# Patient Record
Sex: FEMALE | Race: WHITE | Employment: UNEMPLOYED | URBAN - METROPOLITAN AREA
[De-identification: names, ages, dates, MRNs, and addresses within clinical notes are randomized per-mention and may not be internally consistent; named-entity substitution may affect disease eponyms.]

---

## 2017-04-28 ENCOUNTER — HOSPITAL ENCOUNTER (OUTPATIENT)
Dept: MAMMOGRAPHY | Age: 42
Discharge: HOME | End: 2017-04-28
Attending: FAMILY MEDICINE
Payer: COMMERCIAL

## 2017-04-28 DIAGNOSIS — Z12.31 ENCOUNTER FOR SCREENING MAMMOGRAM FOR BREAST CANCER: ICD-10-CM

## 2017-04-28 PROCEDURE — 77067 SCR MAMMO BI INCL CAD: CPT

## 2017-05-09 PROBLEM — E11.9 CONTROLLED TYPE 2 DIABETES MELLITUS WITHOUT COMPLICATION, WITHOUT LONG-TERM CURRENT USE OF INSULIN (HCC): Status: ACTIVE | Noted: 2017-05-09

## 2017-05-09 PROBLEM — E78.2 MIXED HYPERLIPIDEMIA: Status: ACTIVE | Noted: 2017-05-09

## 2017-05-09 PROBLEM — I10 ESSENTIAL HYPERTENSION: Status: ACTIVE | Noted: 2017-05-09

## 2017-06-19 PROBLEM — R22.9 SUBCUTANEOUS MASS: Status: ACTIVE | Noted: 2017-06-19

## 2018-05-01 PROBLEM — E11.21 TYPE 2 DIABETES WITH NEPHROPATHY (HCC): Status: ACTIVE | Noted: 2018-05-01

## 2018-05-01 PROBLEM — E66.01 OBESITY, MORBID (HCC): Status: ACTIVE | Noted: 2018-05-01

## 2018-05-29 ENCOUNTER — HOSPITAL ENCOUNTER (OUTPATIENT)
Dept: MAMMOGRAPHY | Age: 43
Discharge: HOME | End: 2018-05-29
Attending: FAMILY MEDICINE
Payer: COMMERCIAL

## 2018-05-29 DIAGNOSIS — Z12.39 SCREENING BREAST EXAMINATION: ICD-10-CM

## 2018-05-29 PROCEDURE — 77067 SCR MAMMO BI INCL CAD: CPT

## 2018-06-01 NOTE — PROGRESS NOTES
Your mammogram was normal.  You have dense breast.  This increases your risk of breast cancer. Recommend you do monthly self breast exams. Recommend yearly clinical breast exams and yearly mammograms.

## 2019-06-26 ENCOUNTER — HOSPITAL ENCOUNTER (OUTPATIENT)
Dept: MAMMOGRAPHY | Age: 44
Discharge: HOME | End: 2019-06-26
Attending: OBSTETRICS & GYNECOLOGY
Payer: COMMERCIAL

## 2019-06-26 DIAGNOSIS — Z12.31 VISIT FOR SCREENING MAMMOGRAM: ICD-10-CM

## 2019-06-26 PROCEDURE — 77063 BREAST TOMOSYNTHESIS BI: CPT

## 2019-08-03 ENCOUNTER — APPOINTMENT (OUTPATIENT)
Dept: CT IMAGING | Age: 44
DRG: 660 | End: 2019-08-03
Attending: EMERGENCY MEDICINE
Payer: COMMERCIAL

## 2019-08-03 ENCOUNTER — HOSPITAL ENCOUNTER (INPATIENT)
Age: 44
LOS: 3 days | Discharge: HOME | DRG: 660 | End: 2019-08-06
Attending: EMERGENCY MEDICINE | Admitting: FAMILY MEDICINE
Payer: COMMERCIAL

## 2019-08-03 ENCOUNTER — ANESTHESIA EVENT (OUTPATIENT)
Dept: SURGERY | Age: 44
DRG: 660 | End: 2019-08-03
Payer: COMMERCIAL

## 2019-08-03 ENCOUNTER — APPOINTMENT (OUTPATIENT)
Dept: GENERAL RADIOLOGY | Age: 44
DRG: 660 | End: 2019-08-03
Attending: UROLOGY
Payer: COMMERCIAL

## 2019-08-03 ENCOUNTER — APPOINTMENT (OUTPATIENT)
Dept: GENERAL RADIOLOGY | Age: 44
DRG: 660 | End: 2019-08-03
Attending: EMERGENCY MEDICINE
Payer: COMMERCIAL

## 2019-08-03 DIAGNOSIS — N20.0 RENAL STONE: ICD-10-CM

## 2019-08-03 DIAGNOSIS — E87.1 HYPONATREMIA: ICD-10-CM

## 2019-08-03 DIAGNOSIS — E87.5 ACUTE HYPERKALEMIA: ICD-10-CM

## 2019-08-03 DIAGNOSIS — N17.9 ACUTE RENAL FAILURE, UNSPECIFIED ACUTE RENAL FAILURE TYPE (HCC): Primary | ICD-10-CM

## 2019-08-03 LAB
ALBUMIN SERPL-MCNC: 3.6 G/DL (ref 3.5–5)
ALBUMIN/GLOB SERPL: 0.9 {RATIO} (ref 1.2–3.5)
ALP SERPL-CCNC: 60 U/L (ref 50–136)
ALT SERPL-CCNC: 21 U/L (ref 12–65)
ANION GAP SERPL CALC-SCNC: 15 MMOL/L (ref 7–16)
APPEARANCE UR: CLEAR
AST SERPL-CCNC: 14 U/L (ref 15–37)
BACTERIA URNS QL MICRO: ABNORMAL /HPF
BASOPHILS # BLD: 0 K/UL (ref 0–0.2)
BASOPHILS NFR BLD: 0 % (ref 0–2)
BILIRUB SERPL-MCNC: 0.4 MG/DL (ref 0.2–1.1)
BILIRUB UR QL: NEGATIVE
BUN SERPL-MCNC: 45 MG/DL (ref 6–23)
CALCIUM SERPL-MCNC: 8.9 MG/DL (ref 8.3–10.4)
CASTS URNS QL MICRO: ABNORMAL /LPF
CHLORIDE SERPL-SCNC: 92 MMOL/L (ref 98–107)
CK SERPL-CCNC: 312 U/L (ref 21–215)
CO2 SERPL-SCNC: 19 MMOL/L (ref 21–32)
COLOR UR: YELLOW
CREAT SERPL-MCNC: 9.2 MG/DL (ref 0.6–1)
CREAT UR-MCNC: 50.9 MG/DL
DIFFERENTIAL METHOD BLD: ABNORMAL
EOSINOPHIL # BLD: 0.2 K/UL (ref 0–0.8)
EOSINOPHIL NFR BLD: 2 % (ref 0.5–7.8)
EPI CELLS #/AREA URNS HPF: ABNORMAL /HPF
ERYTHROCYTE [DISTWIDTH] IN BLOOD BY AUTOMATED COUNT: 14.7 % (ref 11.9–14.6)
GLOBULIN SER CALC-MCNC: 3.8 G/DL (ref 2.3–3.5)
GLUCOSE BLD STRIP.AUTO-MCNC: 27 MG/DL (ref 65–100)
GLUCOSE BLD STRIP.AUTO-MCNC: 401 MG/DL (ref 65–100)
GLUCOSE BLD STRIP.AUTO-MCNC: 46 MG/DL (ref 65–100)
GLUCOSE BLD STRIP.AUTO-MCNC: 48 MG/DL (ref 65–100)
GLUCOSE BLD STRIP.AUTO-MCNC: 71 MG/DL (ref 65–100)
GLUCOSE BLD STRIP.AUTO-MCNC: 75 MG/DL (ref 65–100)
GLUCOSE BLD STRIP.AUTO-MCNC: 93 MG/DL (ref 65–100)
GLUCOSE BLD STRIP.AUTO-MCNC: 94 MG/DL (ref 65–100)
GLUCOSE BLD STRIP.AUTO-MCNC: 99 MG/DL (ref 65–100)
GLUCOSE SERPL-MCNC: 121 MG/DL (ref 65–100)
GLUCOSE UR STRIP.AUTO-MCNC: NEGATIVE MG/DL
HCG UR QL: NEGATIVE
HCT VFR BLD AUTO: 31.9 % (ref 35.8–46.3)
HGB BLD-MCNC: 10.2 G/DL (ref 11.7–15.4)
HGB UR QL STRIP: ABNORMAL
IMM GRANULOCYTES # BLD AUTO: 0 K/UL (ref 0–0.5)
IMM GRANULOCYTES NFR BLD AUTO: 0 % (ref 0–5)
KETONES UR QL STRIP.AUTO: NEGATIVE MG/DL
LACTATE SERPL-SCNC: 1.3 MMOL/L (ref 0.4–2)
LEUKOCYTE ESTERASE UR QL STRIP.AUTO: NEGATIVE
LYMPHOCYTES # BLD: 1.6 K/UL (ref 0.5–4.6)
LYMPHOCYTES NFR BLD: 17 % (ref 13–44)
MCH RBC QN AUTO: 26.6 PG (ref 26.1–32.9)
MCHC RBC AUTO-ENTMCNC: 32 G/DL (ref 31.4–35)
MCV RBC AUTO: 83.3 FL (ref 79.6–97.8)
MONOCYTES # BLD: 0.4 K/UL (ref 0.1–1.3)
MONOCYTES NFR BLD: 4 % (ref 4–12)
NEUTS SEG # BLD: 6.8 K/UL (ref 1.7–8.2)
NEUTS SEG NFR BLD: 76 % (ref 43–78)
NITRITE UR QL STRIP.AUTO: NEGATIVE
NRBC # BLD: 0 K/UL (ref 0–0.2)
OTHER OBSERVATIONS,UCOM: ABNORMAL
PH UR STRIP: 5 [PH] (ref 5–9)
PHOSPHATE SERPL-MCNC: 5.8 MG/DL (ref 2.5–4.5)
PLATELET # BLD AUTO: 352 K/UL (ref 150–450)
PMV BLD AUTO: 11.1 FL (ref 9.4–12.3)
POTASSIUM SERPL-SCNC: 4.1 MMOL/L (ref 3.5–5.1)
POTASSIUM SERPL-SCNC: 5.9 MMOL/L (ref 3.5–5.1)
PROT SERPL-MCNC: 7.4 G/DL (ref 6.3–8.2)
PROT UR STRIP-MCNC: ABNORMAL MG/DL
PROT UR-MCNC: 40 MG/DL
PROT/CREAT UR-RTO: 0.8
RBC # BLD AUTO: 3.83 M/UL (ref 4.05–5.2)
RBC #/AREA URNS HPF: ABNORMAL /HPF
SODIUM SERPL-SCNC: 126 MMOL/L (ref 136–145)
SP GR UR REFRACTOMETRY: 1 (ref 1–1.02)
UROBILINOGEN UR QL STRIP.AUTO: 0.2 EU/DL (ref 0.2–1)
WBC # BLD AUTO: 9 K/UL (ref 4.3–11.1)
WBC URNS QL MICRO: ABNORMAL /HPF

## 2019-08-03 PROCEDURE — 74011250636 HC RX REV CODE- 250/636: Performed by: FAMILY MEDICINE

## 2019-08-03 PROCEDURE — 71045 X-RAY EXAM CHEST 1 VIEW: CPT

## 2019-08-03 PROCEDURE — 74011250636 HC RX REV CODE- 250/636: Performed by: EMERGENCY MEDICINE

## 2019-08-03 PROCEDURE — 74011250637 HC RX REV CODE- 250/637: Performed by: EMERGENCY MEDICINE

## 2019-08-03 PROCEDURE — 65270000029 HC RM PRIVATE

## 2019-08-03 PROCEDURE — 84132 ASSAY OF SERUM POTASSIUM: CPT

## 2019-08-03 PROCEDURE — 74011000250 HC RX REV CODE- 250: Performed by: INTERNAL MEDICINE

## 2019-08-03 PROCEDURE — 96374 THER/PROPH/DIAG INJ IV PUSH: CPT | Performed by: EMERGENCY MEDICINE

## 2019-08-03 PROCEDURE — 81001 URINALYSIS AUTO W/SCOPE: CPT

## 2019-08-03 PROCEDURE — 84156 ASSAY OF PROTEIN URINE: CPT

## 2019-08-03 PROCEDURE — 74011250637 HC RX REV CODE- 250/637: Performed by: FAMILY MEDICINE

## 2019-08-03 PROCEDURE — 74011000250 HC RX REV CODE- 250: Performed by: FAMILY MEDICINE

## 2019-08-03 PROCEDURE — 82550 ASSAY OF CK (CPK): CPT

## 2019-08-03 PROCEDURE — 85025 COMPLETE CBC W/AUTO DIFF WBC: CPT

## 2019-08-03 PROCEDURE — 82962 GLUCOSE BLOOD TEST: CPT

## 2019-08-03 PROCEDURE — 74018 RADEX ABDOMEN 1 VIEW: CPT

## 2019-08-03 PROCEDURE — 74176 CT ABD & PELVIS W/O CONTRAST: CPT

## 2019-08-03 PROCEDURE — 80053 COMPREHEN METABOLIC PANEL: CPT

## 2019-08-03 PROCEDURE — 81025 URINE PREGNANCY TEST: CPT

## 2019-08-03 PROCEDURE — 84100 ASSAY OF PHOSPHORUS: CPT

## 2019-08-03 PROCEDURE — 81003 URINALYSIS AUTO W/O SCOPE: CPT | Performed by: EMERGENCY MEDICINE

## 2019-08-03 PROCEDURE — 74011000258 HC RX REV CODE- 258: Performed by: INTERNAL MEDICINE

## 2019-08-03 PROCEDURE — 83605 ASSAY OF LACTIC ACID: CPT

## 2019-08-03 PROCEDURE — 74011250637 HC RX REV CODE- 250/637: Performed by: INTERNAL MEDICINE

## 2019-08-03 PROCEDURE — 99285 EMERGENCY DEPT VISIT HI MDM: CPT | Performed by: EMERGENCY MEDICINE

## 2019-08-03 PROCEDURE — 36415 COLL VENOUS BLD VENIPUNCTURE: CPT

## 2019-08-03 RX ORDER — DEXTROSE 25 % IN WATER 25 %
2.5 SYRINGE (ML) INTRAVENOUS AS NEEDED
Status: DISCONTINUED | OUTPATIENT
Start: 2019-08-03 | End: 2019-08-03 | Stop reason: SDUPTHER

## 2019-08-03 RX ORDER — INSULIN LISPRO 100 [IU]/ML
INJECTION, SOLUTION INTRAVENOUS; SUBCUTANEOUS
Status: DISCONTINUED | OUTPATIENT
Start: 2019-08-03 | End: 2019-08-06 | Stop reason: HOSPADM

## 2019-08-03 RX ORDER — BUPROPION HYDROCHLORIDE 150 MG/1
150 TABLET, EXTENDED RELEASE ORAL 2 TIMES DAILY
Status: DISCONTINUED | OUTPATIENT
Start: 2019-08-03 | End: 2019-08-06 | Stop reason: HOSPADM

## 2019-08-03 RX ORDER — HYDROCODONE BITARTRATE AND ACETAMINOPHEN 5; 325 MG/1; MG/1
1 TABLET ORAL
Status: DISCONTINUED | OUTPATIENT
Start: 2019-08-03 | End: 2019-08-06 | Stop reason: HOSPADM

## 2019-08-03 RX ORDER — SODIUM CHLORIDE 0.9 % (FLUSH) 0.9 %
5-40 SYRINGE (ML) INJECTION AS NEEDED
Status: DISCONTINUED | OUTPATIENT
Start: 2019-08-03 | End: 2019-08-06 | Stop reason: HOSPADM

## 2019-08-03 RX ORDER — DEXTROSE 50 % IN WATER (D50W) INTRAVENOUS SYRINGE
50 AS NEEDED
Status: DISCONTINUED | OUTPATIENT
Start: 2019-08-03 | End: 2019-08-06 | Stop reason: HOSPADM

## 2019-08-03 RX ORDER — DULOXETIN HYDROCHLORIDE 60 MG/1
60 CAPSULE, DELAYED RELEASE ORAL DAILY
Status: DISCONTINUED | OUTPATIENT
Start: 2019-08-03 | End: 2019-08-06 | Stop reason: HOSPADM

## 2019-08-03 RX ORDER — SODIUM CHLORIDE 9 MG/ML
150 INJECTION, SOLUTION INTRAVENOUS CONTINUOUS
Status: DISCONTINUED | OUTPATIENT
Start: 2019-08-03 | End: 2019-08-03

## 2019-08-03 RX ORDER — LORAZEPAM 0.5 MG/1
0.5 TABLET ORAL
Status: DISCONTINUED | OUTPATIENT
Start: 2019-08-03 | End: 2019-08-06 | Stop reason: HOSPADM

## 2019-08-03 RX ORDER — HEPARIN SODIUM 5000 [USP'U]/ML
5000 INJECTION, SOLUTION INTRAVENOUS; SUBCUTANEOUS EVERY 8 HOURS
Status: DISCONTINUED | OUTPATIENT
Start: 2019-08-03 | End: 2019-08-03

## 2019-08-03 RX ORDER — ACETAMINOPHEN 325 MG/1
650 TABLET ORAL
Status: DISCONTINUED | OUTPATIENT
Start: 2019-08-03 | End: 2019-08-06 | Stop reason: HOSPADM

## 2019-08-03 RX ORDER — PANTOPRAZOLE SODIUM 40 MG/1
40 TABLET, DELAYED RELEASE ORAL
Status: DISCONTINUED | OUTPATIENT
Start: 2019-08-04 | End: 2019-08-06 | Stop reason: HOSPADM

## 2019-08-03 RX ORDER — ONDANSETRON 4 MG/1
4 TABLET, ORALLY DISINTEGRATING ORAL
Status: DISCONTINUED | OUTPATIENT
Start: 2019-08-03 | End: 2019-08-06 | Stop reason: HOSPADM

## 2019-08-03 RX ORDER — ACETAMINOPHEN 500 MG
1000 TABLET ORAL
Status: COMPLETED | OUTPATIENT
Start: 2019-08-03 | End: 2019-08-03

## 2019-08-03 RX ORDER — SODIUM CHLORIDE 0.9 % (FLUSH) 0.9 %
5-40 SYRINGE (ML) INJECTION EVERY 8 HOURS
Status: DISCONTINUED | OUTPATIENT
Start: 2019-08-03 | End: 2019-08-06 | Stop reason: HOSPADM

## 2019-08-03 RX ORDER — ONDANSETRON 2 MG/ML
4 INJECTION INTRAMUSCULAR; INTRAVENOUS
Status: COMPLETED | OUTPATIENT
Start: 2019-08-03 | End: 2019-08-03

## 2019-08-03 RX ORDER — BISACODYL 5 MG
5 TABLET, DELAYED RELEASE (ENTERIC COATED) ORAL DAILY PRN
Status: DISCONTINUED | OUTPATIENT
Start: 2019-08-03 | End: 2019-08-06 | Stop reason: HOSPADM

## 2019-08-03 RX ORDER — ATORVASTATIN CALCIUM 40 MG/1
40 TABLET, FILM COATED ORAL
Status: DISCONTINUED | OUTPATIENT
Start: 2019-08-03 | End: 2019-08-06 | Stop reason: HOSPADM

## 2019-08-03 RX ADMIN — PATIROMER 8.4 G: 8.4 POWDER, FOR SUSPENSION ORAL at 15:31

## 2019-08-03 RX ADMIN — ONDANSETRON 4 MG: 4 TABLET, ORALLY DISINTEGRATING ORAL at 21:37

## 2019-08-03 RX ADMIN — Medication: at 12:00

## 2019-08-03 RX ADMIN — LORAZEPAM 0.5 MG: 0.5 TABLET ORAL at 11:14

## 2019-08-03 RX ADMIN — SODIUM CHLORIDE 150 ML/HR: 900 INJECTION, SOLUTION INTRAVENOUS at 11:00

## 2019-08-03 RX ADMIN — ACETAMINOPHEN 650 MG: 325 TABLET, FILM COATED ORAL at 15:37

## 2019-08-03 RX ADMIN — DULOXETINE HYDROCHLORIDE 60 MG: 60 CAPSULE, DELAYED RELEASE ORAL at 11:14

## 2019-08-03 RX ADMIN — DEXTROSE 50 % IN WATER (D50W) INTRAVENOUS SYRINGE 25 G: at 16:02

## 2019-08-03 RX ADMIN — Medication 10 ML: at 21:40

## 2019-08-03 RX ADMIN — SODIUM CHLORIDE 1000 ML: 900 INJECTION, SOLUTION INTRAVENOUS at 09:23

## 2019-08-03 RX ADMIN — ONDANSETRON 4 MG: 4 TABLET, ORALLY DISINTEGRATING ORAL at 11:22

## 2019-08-03 RX ADMIN — ONDANSETRON 4 MG: 2 INJECTION INTRAMUSCULAR; INTRAVENOUS at 07:13

## 2019-08-03 RX ADMIN — ACETAMINOPHEN 1000 MG: 500 TABLET, FILM COATED ORAL at 09:23

## 2019-08-03 RX ADMIN — Medication: at 22:14

## 2019-08-03 RX ADMIN — DEXTROSE 50 % IN WATER (D50W) INTRAVENOUS SYRINGE 25 G: at 11:35

## 2019-08-03 RX ADMIN — Medication 10 ML: at 14:00

## 2019-08-03 RX ADMIN — ACETAMINOPHEN 650 MG: 325 TABLET, FILM COATED ORAL at 21:37

## 2019-08-03 RX ADMIN — HYDROCODONE BITARTRATE AND ACETAMINOPHEN 1 TABLET: 5; 325 TABLET ORAL at 18:11

## 2019-08-03 RX ADMIN — BUPROPION HYDROCHLORIDE 150 MG: 150 TABLET, EXTENDED RELEASE ORAL at 11:14

## 2019-08-03 RX ADMIN — ATORVASTATIN CALCIUM 40 MG: 40 TABLET, FILM COATED ORAL at 21:37

## 2019-08-03 NOTE — PROGRESS NOTES
08/03/19 1400   Dual Skin Pressure Injury Assessment   Dual Skin Pressure Injury Assessment WDL   Second Care Provider (Based on [address] [place] [address]) LYNDSAY [place]   Skin Integumentary   Skin Integumentary (WDL) WDL   Wound Prevention and Protection Methods   Orientation of Wound Prevention Posterior   Location of Wound Prevention Sacrum/Coccyx   Dressing Present  Yes   Dressing Status Intact   Wound Offloading (Prevention Methods) Bed, pressure redistribution/air; Adaptive equipment;Bed, pressure reduction mattress

## 2019-08-03 NOTE — ED NOTES
TRANSFER - OUT REPORT:    Verbal report given to six floor on Sunday ЮЛИЯ Hernandez  being transferred to [facility](unit) for routine progression of care       Report consisted of patients Situation, Background, Assessment and   Recommendations(SBAR). Information from the following report(s) SBAR was reviewed with the receiving nurse. Lines:   Peripheral IV 08/03/19 Left Antecubital (Active)       Peripheral IV 08/03/19 Left Forearm (Active)        Opportunity for questions and clarification was provided.       Patient transported with:   [employer]

## 2019-08-03 NOTE — CONSULTS
GEN GENERIC H&P/CONSULT    Subjective:      Patient is a 38yo F with a history of CKD III ( seen at our office ), DM on  metformin, HTN, and anxiety who presents with episode of confusion, shaking, with  Hypoglycemia with BS down to 30's at home. Received oral and IV dextrose and blood sugars improved. Found to have acute renal failure with cr  Up to 9.20. Notion of 3 months unintentional 30 pounds weight loss and nausea. No abdominal pain. No difficulty of urination but decrease in urine output, no gross hematuria, no dysuria. No skin rash, no arthralgia, no fever, no myalgia   Pt has been drinking plenty of fluid  Treated recently for UTI with Cipro  Lab:     Ref. Range 5/29/2018 09:00 4/25/2019 08:49 5/2/2019 08:32 7/16/2019 15:45 8/3/2019 07:15   Creatinine Latest Ref Range: 0.6 - 1.0 MG/DL 1.30 (H) 2.41 (H) 1.71 (H) 3.13 (>) 9.20 (H)     Ct scan: 9 mm calculus in the left renal pelvis. Minimal perinephric  stranding without hydronephrosis.     Past Medical History:   Diagnosis Date    PANCHO (acute kidney injury) ([facility] [address]     saw nephrologist 2/26/14. [place] due to volume depletion, meds. Meds adjusted, now f/u with PCP    Diabetes ([facility] [address] 2011    [place], avgfbs 115, last HA1C 6.6, does not experience hypo    Gall bladder disease     GERD (gastroesophageal reflux disease)     controlled with protonix    Hypercholesterolemia     Hypertension     well controlled by medication    Morbid obesity ([address]     BMI 41.7    Psychiatric disorder     depression , controlled by cymbalta    Sleep apnea       Past Surgical History:   Procedure Laterality Date    HX CHOLECYSTECTOMY      HX TONSILLECTOMY        Prior to Admission medications    Medication Sig Start Date End Date Taking? Authorizing Provider   cefUROXime (CEFTIN) 250 mg tablet Take 1 Tab by mouth two (2) times a day. 7/17/19   Reggie Chinchilla MD   omeprazole (PRILOSEC) 10 mg capsule Take 1 Cap by mouth daily.  7/16/19   Reggie Chinchilla MD fluticasone propionate (FLONASE) 50 mcg/actuation nasal spray  5/10/19   Provider, Historical   ondansetron (ZOFRAN ODT) 4 mg disintegrating tablet Take 1 Tab by mouth every eight (8) hours as needed for Nausea. 7/16/19   Vance Franco MD   norethindrone (JASON) 0.35 mg tab Take 1 Tab by mouth daily. 5/20/19   Brandon Goldstein MD   glyBURIDE-metFORMIN (GLUCOVANCE) 2.5-500 mg per tablet Take 2 Tabs by mouth two (2) times daily (with meals). 5/7/19   Vance Franco MD   atorvastatin (LIPITOR) 40 mg tablet TAKE ONE TABLET BY MOUTH ONCE DAILY. 5/7/19   Vance Franco MD   LORazepam (ATIVAN) 1 mg tablet Take 1/2 to one tablet twice a day as needed 5/7/19   Vance Franco MD   buPROPion XL (WELLBUTRIN XL) 150 mg tablet Take 1 Tab by mouth every morning. 5/7/19   Vance Franco MD   DULoxetine (CYMBALTA) 60 mg capsule Take one capsule a day 11/6/18   Vance Franco MD   losartan-hydroCHLOROthiazide [facility]) 100-12.5 mg per tablet TAKE ONE TABLET BY MOUTH ONCE DAILY. 11/6/18   Vance Franco MD   cpap machine kit by Does Not Apply route. Replacement machine 10/5/18   Vance Franco MD   OTHER CPAP supplies 10/5/18   Vance Franco MD   Ferrous Fumarate 325 mg (106 mg iron) tab Take  by mouth. Provider, Historical   levocetirizine (XYZAL) 5 mg tablet Take 1 Tab by mouth daily. Dx:477.9 - failed allegra, claritin, zyrtec and flonase 5/9/17   Vance Franco MD   cpap machine kit by Does Not Apply route. 14cm    Provider, Historical   glucose blood VI test strips (ONETOUCH ULTRA TEST) strip Test blood sugar daily, either fasting or 2 hours after eating for diabetes and record in log. (#100)  Dx: 250.00 DM 5/29/15   Kaitlynn Gill MD   aspirin delayed-release 81 mg tablet Take  by mouth daily.     Provider, Historical     Allergies   Allergen Reactions    Amoxil [Amoxicillin] Other (comments)     Itching     Percocet [Oxycodone-Acetaminophen] Itching      Social History Tobacco Use    Smoking status: Never Smoker    Smokeless tobacco: Never Used   Substance Use Topics    Alcohol use: Yes     Alcohol/week: 0.0 standard drinks     Comment: occasional 1 gls per month      Family History   Adopted: Yes   Family history unknown: Yes          Review of Systems  As above    Objective:       Visit Vitals  /77   Pulse 96   Temp 97.7 °F (36.5 °C)   Resp 18   Ht 5' 5\" (1.651 m)   Wt 98.9 kg (218 lb)   SpO2 100%   BMI 36.28 kg/m²       No intake/output data recorded. No intake/output data recorded. PE:  NARD  Lung: clear. CV: RR, no rub  Abd: soft, not tender  Ext: no edema    Data Review:     Recent Results (from the past 24 hour(s))   GLUCOSE, POC    Collection Time: 08/03/19  7:06 AM   Result Value Ref Range    Glucose (POC) 401 (H) 65 - 100 mg/dL   CBC WITH AUTOMATED DIFF    Collection Time: 08/03/19  7:15 AM   Result Value Ref Range    WBC 9.0 4.3 - 11.1 K/uL    RBC 3.83 (L) 4.05 - 5.2 M/uL    HGB 10.2 (L) 11.7 - 15.4 g/dL    HCT 31.9 (L) 35.8 - 46.3 %    MCV 83.3 79.6 - 97.8 FL    MCH 26.6 26.1 - 32.9 PG    MCHC 32.0 31.4 - 35.0 g/dL    RDW 14.7 (H) 11.9 - 14.6 %    PLATELET 920 518 - 139 K/uL    MPV 11.1 9.4 - 12.3 FL    ABSOLUTE NRBC 0.00 0.0 - 0.2 K/uL    DF AUTOMATED      NEUTROPHILS 76 43 - 78 %    LYMPHOCYTES 17 13 - 44 %    MONOCYTES 4 4.0 - 12.0 %    EOSINOPHILS 2 0.5 - 7.8 %    BASOPHILS 0 0.0 - 2.0 %    IMMATURE GRANULOCYTES 0 0.0 - 5.0 %    ABS. NEUTROPHILS 6.8 1.7 - 8.2 K/UL    ABS. LYMPHOCYTES 1.6 0.5 - 4.6 K/UL    ABS. MONOCYTES 0.4 0.1 - 1.3 K/UL    ABS. EOSINOPHILS 0.2 0.0 - 0.8 K/UL    ABS. BASOPHILS 0.0 0.0 - 0.2 K/UL    ABS. IMM.  GRANS. 0.0 0.0 - 0.5 K/UL   METABOLIC PANEL, COMPREHENSIVE    Collection Time: 08/03/19  7:15 AM   Result Value Ref Range    Sodium 126 (L) 136 - 145 mmol/L    Potassium 5.9 (H) 3.5 - 5.1 mmol/L    Chloride 92 (L) 98 - 107 mmol/L    CO2 19 (L) 21 - 32 mmol/L    Anion gap 15 7 - 16 mmol/L    Glucose 121 (H) 65 - 100 mg/dL BUN 45 (H) 6 - 23 MG/DL    Creatinine 9.20 (H) 0.6 - 1.0 MG/DL    GFR est AA 6 (L) >60 ml/min/1.73m2    GFR est non-AA 5 (L) >60 ml/min/1.73m2    Calcium 8.9 8.3 - 10.4 MG/DL    Bilirubin, total 0.4 0.2 - 1.1 MG/DL    ALT (SGPT) 21 12 - 65 U/L    AST (SGOT) 14 (L) 15 - 37 U/L    Alk. phosphatase 60 50 - 136 U/L    Protein, total 7.4 6.3 - 8.2 g/dL    Albumin 3.6 3.5 - 5.0 g/dL    Globulin 3.8 (H) 2.3 - 3.5 g/dL    A-G Ratio 0.9 (L) 1.2 - 3.5     HCG URINE, QL. - POC    Collection Time: 08/03/19  8:59 AM   Result Value Ref Range    Pregnancy test,urine (POC) NEGATIVE  NEG             Assessment:     Principal Problem:    Acute renal failure (ARF) ([facility] [address] (8/3/2019)    Active Problems:    HTN (hypertension) (10/3/2013)      MARILY (obstructive sleep apnea) (1/7/2016)      Mixed hyperlipidemia (5/9/2017)      Obesity, morbid ([facility] [address] (5/1/2018)      Type 2 diabetes with nephropathy ([facility] [address] (5/1/2018)      Renal stone (8/3/2019)     PANCHO (on CKD III): L. Kidney stone but no hydro. ( but volume status may be be on low side)  May be AIN due to Cipro. Mild hyperkalemia  Mild acidosis? Plan:   Check UA, urine lytes, urine protein, CK lactic acid. Continue with hydration with NaHCO3  [place]. Low K diet. May need kidney Bx   Urology consult.   Thanks  Dr. Junior Anderson  Hopefully, may avoid dialysis

## 2019-08-03 NOTE — CONSULTS
[address] Urology Consult Note                                           08/03/19     Patient: Sunday Fabiola Marroquin  MRN: [id number]    Admission Date:  8/3/2019, 0  Admission Diagnosis: Acute renal failure (ARF) ([facility] [address] [N17.9]  Reason for Consult: Left Kidney Stone    ASSESSMENT: 39 y.o. female PANCHO and 9 mm left renal pelvis stone who is admitted for PANCHO, hypoglycemia, confusion. Urology is consulted for the kidney stone. Khushbu Yeh is in the renal pelvis and without hydro-ureteronephrosis. I am not convinced that the represents the sole source of her PANCHO given that it is in the renal pelvis (not ureter) and there is no hydro-ureteronephrosis. She also has no flank pain. However, I do recommend intervention to eliminate the stone as a possible source of her PANCHO, as it could be causing intermittent ureteral obstruction     PLAN:  I reviewed the risks/benefits/alternatives for stone treatment today in detail with the patient. Treatment options discussed include medical expulsive therapy (MET) vs. ESWL vs. Ureteroscopy/laser lithotropsy vs. PCNL. MET not recommended given PANCHO. We discussed that her two options are ESWL + stent and ureteroscopy/laser lithotripsy. After our discussion, the patient would like to proceed with cystoscopy, left ureteral stent placement, LEFT ESWL. Risks of ESWL that we discussed were bleeding, infection, flank pain, bruising, renal hematoma, injury to surrounding structures, incomplete fragmentation of stones, steinstrasse, inability to pass stone fragments requiring additional operative intervention in the form of ureteroscopy/laser lithotripsy and/or stent placement, renal failure, hypertension, risks of general anesthesia, recurrent stones. The patient expressed understanding of the above. Will obtain Stat KUB to ensure that stone is visible. If stone is visible then will proceed.   If not visible, will change to ureteroscopy/laser lithotripsy. Patient will be NPO at midnight for surgery tomorrow. Heparin PPX held starting now in anticipation of surgery tomorrow. On ASA 81 mg at home but has been off of this X 2 days. No other anti-coagulation. >50% of visit spent counseling patient on the above.    __________________________________________________________________________________    HPI:     Sunday Helen Shaikh is a 39 y.o. female with a history of CKD secondary to DM, who was admitted to the hospital from the ER for hypoglycemia (blood sugar in the 30's), confusion and shaking. Cr was acutely elevated to 9.20 from baseline of 3.3 in the ER. CT A/P in the ER showed an incidental 9 mm L renal pelvis stone without hydronephrosis or definite signs of obstruction. She denies a history of kidney stones. No fevers. No hematuria, urgency, frequency, retention, incontinence. She follows with Nephrology for her CKD. Past Medical History:  Past Medical History:   Diagnosis Date    PANCHO (acute kidney injury) ([facility] [address]     [place] nephrologist 2/26/14. [place] due to volume depletion, meds. Meds adjusted, now f/u with PCP    Diabetes ([facility] [address] 2011    [place], avgfbs 115, last HA1C 6.6, does not experience hypo    Gall bladder disease     GERD (gastroesophageal reflux disease)     controlled with protonix    Hypercholesterolemia     Hypertension     well controlled by medication    Morbid obesity ([facility] [address]     BMI 41.7    Psychiatric disorder     depression , controlled by cymbalta    Sleep apnea        Past Surgical History:  Past Surgical History:   Procedure Laterality Date    HX CHOLECYSTECTOMY      HX TONSILLECTOMY         Medications:  No current facility-administered medications on file prior to encounter. Current Outpatient Medications on File Prior to Encounter   Medication Sig Dispense Refill    cefUROXime (CEFTIN) 250 mg tablet Take 1 Tab by mouth two (2) times a day.  14 Tab 0    omeprazole (PRILOSEC) 10 mg capsule Take 1 Cap by mouth daily. 30 Cap 3    fluticasone propionate (FLONASE) 50 mcg/actuation nasal spray       ondansetron (ZOFRAN ODT) 4 mg disintegrating tablet Take 1 Tab by mouth every eight (8) hours as needed for Nausea. 12 Tab 1    norethindrone (JASON) 0.35 mg tab Take 1 Tab by mouth daily. 28 Tab 11    glyBURIDE-metFORMIN (GLUCOVANCE) 2.5-500 mg per tablet Take 2 Tabs by mouth two (2) times daily (with meals). 120 Tab 6    atorvastatin (LIPITOR) 40 mg tablet TAKE ONE TABLET BY MOUTH ONCE DAILY. 30 Tab 6    LORazepam (ATIVAN) 1 mg tablet Take 1/2 to one tablet twice a day as needed 30 Tab 0    buPROPion XL (WELLBUTRIN XL) 150 mg tablet Take 1 Tab by mouth every morning. 30 Tab 11    DULoxetine (CYMBALTA) 60 mg capsule Take one capsule a day 30 Cap 11    losartan-hydroCHLOROthiazide (HYZAAR) 100-12.5 mg per tablet TAKE ONE TABLET BY MOUTH ONCE DAILY. 30 Tab 10    cpap machine kit by Does Not Apply route. Replacement machine 1 Kit 0    OTHER CPAP supplies 1 Units 0    Ferrous Fumarate 325 mg (106 mg iron) tab Take  by mouth.  levocetirizine (XYZAL) 5 mg tablet Take 1 Tab by mouth daily. Dx:477.9 - failed allegra, claritin, zyrtec and flonase 30 Tab 11    cpap machine kit by Does Not Apply route. 14cm      glucose blood VI test strips (ONETOUCH ULTRA TEST) strip Test blood sugar daily, either fasting or 2 hours after eating for diabetes and record in log. (#100)  Dx: 250.00 DM 1 Each 11    aspirin delayed-release 81 mg tablet Take  by mouth daily. Allergies:   Allergies   Allergen Reactions    Amoxil [Amoxicillin] Other (comments)     Itching     Percocet [Oxycodone-Acetaminophen] Itching       Social History:  Social History     Socioeconomic History    Marital status:      Spouse name: Not on file    Number of children: Not on file    Years of education: Not on file    Highest education level: Not on file   Occupational History    Not on file   Social Needs    Financial resource strain: Not on file    Food insecurity:     Worry: Not on file     Inability: Not on file    Transportation needs:     Medical: Not on file     Non-medical: Not on file   Tobacco Use    Smoking status: Never Smoker    Smokeless tobacco: Never Used   Substance and Sexual Activity    Alcohol use:  Yes     Alcohol/week: 0.0 standard drinks     Comment: occasional 1 gls per month    Drug use: No    Sexual activity: Not Currently     Birth control/protection: Pill   Lifestyle    Physical activity:     Days per week: Not on file     Minutes per session: Not on file    Stress: Not on file   Relationships    Social connections:     Talks on phone: Not on file     Gets together: Not on file     Attends Oriental orthodox service: Not on file     Active member of club or organization: Not on file     Attends meetings of clubs or organizations: Not on file     Relationship status: Not on file    Intimate partner violence:     Fear of current or ex partner: Not on file     Emotionally abused: Not on file     Physically abused: Not on file     Forced sexual activity: Not on file   Other Topics Concern    Not on file   Social History Narrative    Not on file       Family History:  Family History   Adopted: Yes   Family history unknown: Yes       ROS:  Review of Systems - General ROS: negative for - chills, fatigue or fever  Respiratory ROS: no cough, shortness of breath, or wheezing  Cardiovascular ROS: no chest pain or dyspnea on exertion  Gastrointestinal ROS: no abdominal pain, change in bowel habits, or black or bloody stools  Musculoskeletal ROS: negative  negative for - muscular weakness    Vitals:  Visit Vitals  /77   Pulse 96   Temp 97.7 °F (36.5 °C)   Resp 18   Ht 5' 5\" (1.651 m)   Wt 218 lb (98.9 kg)   SpO2 100%   BMI 36.28 kg/m²       Intake/Output:  No intake or output data in the 24 hours ending 08/03/19 1346     Physical Exam:   Visit Vitals  /77   Pulse 96   Temp 97.7 °F (36.5 °C)   Resp 18 Ht 5' 5\" (1.651 m)   Wt 218 lb (98.9 kg)   SpO2 100%   BMI 36.28 kg/m²        GENERAL: No acute distress, Awake, Alert, Oriented X 3, Lying in bed  HEENT: Trachea midline, No gross visual or auditory impairments  CARDIAC: regular rate and rhythm  CHEST AND LUNG: Easy work of breathing, clear to auscultation bilaterally, no cyanosis  ABDOMEN: soft, non tender, non-distended, positive bowel sounds, no organomegaly, no palpable masses, no guarding, no rebound tenderness  BACK: No CVA TTP bilaterally. SKIN: No rash, no erythema, no lacerations or abrasions, no ecchymosis  NEUROLOGIC: cranial nerves 2-12 grossly intact     Lab/Radiology/Other Diagnostic Tests:  Recent Labs     08/03/19  0715   HGB 10.2*   HCT 31.9*   WBC 9.0   *   K 5.9*   CL 92*   CO2 19*   BUN 45*   *   CA 8.9   ALT 21     CT A/P 8/3/19    IMPRESSION: 9 mm calculus in the left renal pelvis. Minimal perinephric stranding without hydronephrosis.     Murali Delarosa M.D.     [facility]  [address]  Phone: [phone]  Fax: [phone]

## 2019-08-03 NOTE — ANESTHESIA PREPROCEDURE EVALUATION
Relevant Problems   No relevant active problems       Anesthetic History   No history of anesthetic complications            Review of Systems / Medical History  Patient summary reviewed and pertinent labs reviewed    Pulmonary        Sleep apnea: CPAP           Neuro/Psych         Psychiatric history     Cardiovascular    Hypertension        Dysrhythmias : sinus tachycardia      Exercise tolerance[de-identified] ~4 METS     GI/Hepatic/Renal     GERD    Renal disease: CRI and ARF      Comments: Poor appetite.   30 lb weight loss over last 3 months Endo/Other    Diabetes (A1c <7): well controlled, type 2    Obesity     Other Findings            Physical Exam    Airway  Mallampati: III  TM Distance: 4 - 6 cm  Neck ROM: normal range of motion   Mouth opening: Diminished (comment)     Cardiovascular    Rhythm: regular  Rate: abnormal        Comments: tachy Dental         Pulmonary  Breath sounds clear to auscultation               Abdominal         Other Findings            Anesthetic Plan    ASA: 3  Anesthesia type: general          Induction: Intravenous  Anesthetic plan and risks discussed with: Patient and Spouse

## 2019-08-03 NOTE — PROGRESS NOTES
Stone is NOT visible on KUB plain film. Therefore, plan will change and we will proceed with Cystoscopy, LEFT Ureteroscopy, Laser Lithotripsy, LEFT Ureteral Stent Placement tomorrow instead of ESWL. OR notified of the surgery change. Murali Rae M.D.     [facility]  [address], [address] [facility]  Phone: [phone]  Fax: [phone]

## 2019-08-03 NOTE — H&P
HOSPITALIST H&P/CONSULT  NAME:   Ekaterina Campos   Age:  39 y.o.  :   1975   MRN:   [id number]  PCP: Onesimo Rosado MD  Consulting MD:  Treatment Team: Attending Provider: María Smith MD; Primary Nurse: Blayne Bolton, RN; Primary Nurse: Silver Paredes RN  HPI:   Patient is a 38yo F with a history of DM, HTN, and anxiety who presents with episode of confusion, shaking, hypoglycemia at home. Received oral and IV dextrose and blood sugars improved. Pt noted in ER to have acute renal failure to cr 9.20. Has recent history of worsening CKD (3.13 on ) from baseline closer to 1.5. Takes metformin and losartan/hctz. Recently had an outpatient consult with [facility] nephrology, pt reports her labs were improved at that time. Does have chronic loose stools attributed to metformin but now with 2-3 months unintentional weight loss and nausea. No abdominal pain. No change in urine output, which she reports as chronically on the low side. Complete ROS done and is as stated in HPI or otherwise negative  Past Medical History:   Diagnosis Date    PANCHO (acute kidney injury) ([facility] [address]     saw nephrologist 14. [place] due to volume depletion, meds. Meds adjusted, now f/u with PCP    Diabetes ([facility] [address]     [place], avgfbs 115, last HA1C 6.6, does not experience hypo    Gall bladder disease     GERD (gastroesophageal reflux disease)     controlled with protonix    Hypercholesterolemia     Hypertension     well controlled by medication    Morbid obesity ([facility] [address]     BMI 41.7    Psychiatric disorder     depression , controlled by cymbalta    Sleep apnea       Past Surgical History:   Procedure Laterality Date    HX CHOLECYSTECTOMY      HX TONSILLECTOMY      reviewed  Prior to Admission Medications   Prescriptions Last Dose Informant Patient Reported? Taking?    DULoxetine (CYMBALTA) 60 mg capsule   No No   Sig: Take one capsule a day   Ferrous Fumarate 325 mg (106 mg iron) tab   Yes No   Sig: Take  by mouth. LORazepam (ATIVAN) 1 mg tablet   No No   Sig: Take 1/2 to one tablet twice a day as needed   OTHER   No No   Sig: CPAP supplies   aspirin delayed-release 81 mg tablet   Yes No   Sig: Take  by mouth daily. atorvastatin (LIPITOR) 40 mg tablet   No No   Sig: TAKE ONE TABLET BY MOUTH ONCE DAILY. buPROPion XL (WELLBUTRIN XL) 150 mg tablet   No No   Sig: Take 1 Tab by mouth every morning. cefUROXime (CEFTIN) 250 mg tablet   No No   Sig: Take 1 Tab by mouth two (2) times a day. cpap machine kit   Yes No   Sig: by Does Not Apply route. 14cm   cpap machine kit   No No   Sig: by Does Not Apply route. Replacement machine   fluticasone propionate (FLONASE) 50 mcg/actuation nasal spray   Yes No   glucose blood VI test strips (ONETOUCH ULTRA TEST) strip   No No   Sig: Test blood sugar daily, either fasting or 2 hours after eating for diabetes and record in log. (#100)  Dx: 250.00 DM   glyBURIDE-metFORMIN (GLUCOVANCE) 2.5-500 mg per tablet   No No   Sig: Take 2 Tabs by mouth two (2) times daily (with meals). levocetirizine (XYZAL) 5 mg tablet   No No   Sig: Take 1 Tab by mouth daily. Dx:477.9 - failed allegra, claritin, zyrtec and flonase   losartan-hydroCHLOROthiazide (HYZAAR) 100-12.5 mg per tablet   No No   Sig: TAKE ONE TABLET BY MOUTH ONCE DAILY. norethindrone (JASON) 0.35 mg tab   No No   Sig: Take 1 Tab by mouth daily. omeprazole (PRILOSEC) 10 mg capsule   No No   Sig: Take 1 Cap by mouth daily. ondansetron (ZOFRAN ODT) 4 mg disintegrating tablet   No No   Sig: Take 1 Tab by mouth every eight (8) hours as needed for Nausea. Facility-Administered Medications: None     Allergies   Allergen Reactions    Amoxil [Amoxicillin] Other (comments)     Itching     Percocet [Oxycodone-Acetaminophen] Itching      Social History     Tobacco Use    Smoking status: Never Smoker    Smokeless tobacco: Never Used   Substance Use Topics    Alcohol use:  Yes     Alcohol/week: 0.0 standard drinks     Comment: occasional 1 gls per month      Family History   Adopted: Yes   Family history unknown: Yes    reviewed  Objective:     Visit Vitals  BP (!) 142/97 (BP 1 Location: Left arm)   Pulse 94   Temp 97.9 °F (36.6 °C)   Resp 20   Ht 5' 5\" (1.651 m)   Wt 98.9 kg (218 lb)   BMI 36.28 kg/m²      Temp (24hrs), Av.9 °F (36.6 °C), Min:97.9 °F (36.6 °C), Max:97.9 °F (36.6 °C)       Physical Exam:  General:    Alert, cooperative, NAD  Head:   Normocephalic, without obvious abnormality, atraumatic. Nose:  Nares normal. No drainage or sinus tenderness. Lungs:   Clear to auscultation bilaterally. No Wheezing or Rhonchi. No rales. Heart:   Regular rate and rhythm,  no murmur, rub or gallop. Abdomen:   Soft, non-tender. Not distended. Bowel sounds normal. No CVA tenderness  Extremities: No cyanosis. No edema. No clubbing  Skin:     Texture, turgor normal. No rashes or lesions. Not Jaundiced  Neurologic: Alert and oriented x 3, no focal deficits   Data Review:   Recent Results (from the past 24 hour(s))   GLUCOSE, POC    Collection Time: 19  7:06 AM   Result Value Ref Range    Glucose (POC) 401 (H) 65 - 100 mg/dL   CBC WITH AUTOMATED DIFF    Collection Time: 19  7:15 AM   Result Value Ref Range    WBC 9.0 4.3 - 11.1 K/uL    RBC 3.83 (L) 4.05 - 5.2 M/uL    HGB 10.2 (L) 11.7 - 15.4 g/dL    HCT 31.9 (L) 35.8 - 46.3 %    MCV 83.3 79.6 - 97.8 FL    MCH 26.6 26.1 - 32.9 PG    MCHC 32.0 31.4 - 35.0 g/dL    RDW 14.7 (H) 11.9 - 14.6 %    PLATELET 576 558 - 476 K/uL    MPV 11.1 9.4 - 12.3 FL    ABSOLUTE NRBC 0.00 0.0 - 0.2 K/uL    DF AUTOMATED      NEUTROPHILS 76 43 - 78 %    LYMPHOCYTES 17 13 - 44 %    MONOCYTES 4 4.0 - 12.0 %    EOSINOPHILS 2 0.5 - 7.8 %    BASOPHILS 0 0.0 - 2.0 %    IMMATURE GRANULOCYTES 0 0.0 - 5.0 %    ABS. NEUTROPHILS 6.8 1.7 - 8.2 K/UL    ABS. LYMPHOCYTES 1.6 0.5 - 4.6 K/UL    ABS. MONOCYTES 0.4 0.1 - 1.3 K/UL    ABS. EOSINOPHILS 0.2 0.0 - 0.8 K/UL    ABS.  BASOPHILS 0.0 0.0 - 0.2 K/UL    ABS. IMM. GRANS. 0.0 0.0 - 0.5 K/UL   METABOLIC PANEL, COMPREHENSIVE    Collection Time: 08/03/19  7:15 AM   Result Value Ref Range    Sodium 126 (L) 136 - 145 mmol/L    Potassium 5.9 (H) 3.5 - 5.1 mmol/L    Chloride 92 (L) 98 - 107 mmol/L    CO2 19 (L) 21 - 32 mmol/L    Anion gap 15 7 - 16 mmol/L    Glucose 121 (H) 65 - 100 mg/dL    BUN 45 (H) 6 - 23 MG/DL    Creatinine 9.20 (H) 0.6 - 1.0 MG/DL    GFR est AA 6 (L) >60 ml/min/1.73m2    GFR est non-AA 5 (L) >60 ml/min/1.73m2    Calcium 8.9 8.3 - 10.4 MG/DL    Bilirubin, total 0.4 0.2 - 1.1 MG/DL    ALT (SGPT) 21 12 - 65 U/L    AST (SGOT) 14 (L) 15 - 37 U/L    Alk. phosphatase 60 50 - 136 U/L    Protein, total 7.4 6.3 - 8.2 g/dL    Albumin 3.6 3.5 - 5.0 g/dL    Globulin 3.8 (H) 2.3 - 3.5 g/dL    A-G Ratio 0.9 (L) 1.2 - 3.5       Imaging /Procedures /Studies   XR CHEST PORT   Final Result   IMPRESSION:    1. No acute cardiopulmonary process evident on single frontal view of the   chest.               CT UROGRAM WO CONT    (Results Pending)       Assessment and Plan: Active Hospital Problems    Diagnosis Date Noted    Acute renal failure (ARF) ([facility] [address] 08/03/2019    Renal stone 08/03/2019    Type 2 diabetes with nephropathy ([facility] [address] 05/01/2018    Obesity, morbid ([facility] [address] 05/01/2018    Mixed hyperlipidemia 05/09/2017    MARILY (obstructive sleep apnea) 01/07/2016    HTN (hypertension) 10/03/2013       PLAN:    Renal stone: urology consult    ARF, hyponatremia, hyperkalemia: 2/2 above. Urology consult. Fluids, I&O. Hold metformin, losartan, hctz. No ekg changes. Monitor bmp. DM: hypoglycemia accounts for initial presentation, now resolved. Not on home insulin. Hold glyburide. SSI. HTN: holding home antihypertensives due to ARF. Adjust as needed.      MARILY: cpap    HLD: home meds    Anxiety: home meds    DVT PPx: HSQ  Code Status: full    Anticipated discharge: 3-4d    Signed By: Belen Roberts MD     August 3, 2019

## 2019-08-03 NOTE — ED PROVIDER NOTES
Patient with a history of diabetes. Woke around 5:30 AM gasping for breath. Checked her blood sugar found to be low. EMS was called out initial blood sugar 62. Rechecked in ambulance and was 30. D10 given in route with 87 prior to arrival.  Feeling some better here. The history is provided by the patient. No  was used. Low Blood Sugar    This is a new problem. The current episode started 1 to 2 hours ago. The problem has been resolved. Associated symptoms include weakness. Pertinent negatives include no confusion, no seizures, no tingling and no numbness. Mental status baseline is normal.  Her past medical history is significant for diabetes. Past Medical History:   Diagnosis Date    PANCHO (acute kidney injury) ([facility] [address]     saw nephrologist 2/26/14. [place] due to volume depletion, meds.   Meds adjusted, now f/u with PCP    Diabetes ([facility] [address] 2011    [place], avgfbs 115, last HA1C 6.6, does not experience hypo    Gall bladder disease     GERD (gastroesophageal reflux disease)     controlled with protonix    Hypercholesterolemia     Hypertension     well controlled by medication    Morbid obesity ([facility] [address]     BMI 41.7    Psychiatric disorder     depression , controlled by cymbalta    Sleep apnea        Past Surgical History:   Procedure Laterality Date    HX CHOLECYSTECTOMY      HX TONSILLECTOMY           Family History:   Adopted: Yes   Family history unknown: Yes       Social History     Socioeconomic History    Marital status:      Spouse name: Not on file    Number of children: Not on file    Years of education: Not on file    Highest education level: Not on file   Occupational History    Not on file   Social Needs    Financial resource strain: Not on file    Food insecurity:     Worry: Not on file     Inability: Not on file    Transportation needs:     Medical: Not on file     Non-medical: Not on file   Tobacco Use    Smoking status: Never Smoker    Smokeless tobacco: Never Used   Substance and Sexual Activity    Alcohol use: Yes     Alcohol/week: 0.0 standard drinks     Comment: occasional 1 gls per month    Drug use: No    Sexual activity: Not Currently     Birth control/protection: Pill   Lifestyle    Physical activity:     Days per week: Not on file     Minutes per session: Not on file    Stress: Not on file   Relationships    Social connections:     Talks on phone: Not on file     Gets together: Not on file     Attends [employer] service: Not on file     Active member of club or organization: Not on file     Attends meetings of clubs or organizations: Not on file     Relationship status: Not on file    Intimate partner violence:     Fear of current or ex partner: Not on file     Emotionally abused: Not on file     Physically abused: Not on file     Forced sexual activity: Not on file   Other Topics Concern    Not on file   Social History Narrative    Not on file         ALLERGIES: Amoxil [amoxicillin] and Percocet [oxycodone-acetaminophen]    Review of Systems   Constitutional: Negative for chills and fever. HENT: Negative for rhinorrhea and sore throat. Eyes: Negative for pain and redness. Respiratory: Positive for shortness of breath. Negative for chest tightness and wheezing. Cardiovascular: Negative for chest pain and leg swelling. Gastrointestinal: Negative for abdominal pain, diarrhea, nausea and vomiting. Genitourinary: Negative for dysuria and hematuria. Musculoskeletal: Negative for back pain, gait problem, neck pain and neck stiffness. Skin: Negative for color change and rash. Neurological: Positive for weakness. Negative for tingling, seizures, numbness and headaches. Psychiatric/Behavioral: Negative for confusion. Vitals:    08/03/19 0703   Temp: 97.9 °F (36.6 °C)            Physical Exam   Constitutional: She is oriented to person, place, and time. She appears well-developed and well-nourished.    HENT:   Head: Normocephalic and atraumatic. Neck: Normal range of motion. Neck supple. Cardiovascular: Normal rate and regular rhythm. Pulmonary/Chest: Effort normal and breath sounds normal. No respiratory distress. Abdominal: Soft. Bowel sounds are normal. There is no tenderness. Musculoskeletal: Normal range of motion. She exhibits no edema. Neurological: She is alert and oriented to person, place, and time. Skin: Skin is warm and dry. MDM  Number of Diagnoses or Management Options  Diagnosis management comments: Patient with acute renal failure and elevated potassium 5.9. Also with hyponatremia. We will admit to the hospital.       Amount and/or Complexity of Data Reviewed  Clinical lab tests: ordered and reviewed  Tests in the radiology section of CPT®: ordered and reviewed  Tests in the medicine section of CPT®: ordered and reviewed    Patient Progress  Patient progress: stable         Procedures      EKG: normal sinus rhythm, nonspecific ST and T waves changes. Rate 85. XR CHEST PORT (Final result)   Result time 08/03/19 08:06:33   Final result by Mandy Rivera MD (08/03/19 08:06:33)                Impression:    IMPRESSION:   1.  No acute cardiopulmonary process evident on single frontal view of the  chest.                Narrative:    CHEST X-RAY, single portable view  8/3/2019    History: Weakness. Technique: Single frontal view of the chest.    Comparison: None. Findings: The cardiac silhouette is normal in respect to size.  The lungs are expanded  without evidence for pneumothorax.   No consolidative airspace process or  pleural effusion is seen.               Results Include:    Recent Results (from the past 24 hour(s))   GLUCOSE, POC    Collection Time: 08/03/19  7:06 AM   Result Value Ref Range    Glucose (POC) 401 (H) 65 - 100 mg/dL   CBC WITH AUTOMATED DIFF    Collection Time: 08/03/19  7:15 AM   Result Value Ref Range    WBC 9.0 4.3 - 11.1 K/uL    RBC 3.83 (L) 4.05 - 5.2 M/uL    HGB 10.2 (L) 11.7 - 15.4 g/dL    HCT 31.9 (L) 35.8 - 46.3 %    MCV 83.3 79.6 - 97.8 FL    MCH 26.6 26.1 - 32.9 PG    MCHC 32.0 31.4 - 35.0 g/dL    RDW 14.7 (H) 11.9 - 14.6 %    PLATELET 772 525 - 612 K/uL    MPV 11.1 9.4 - 12.3 FL    ABSOLUTE NRBC 0.00 0.0 - 0.2 K/uL    DF AUTOMATED      NEUTROPHILS 76 43 - 78 %    LYMPHOCYTES 17 13 - 44 %    MONOCYTES 4 4.0 - 12.0 %    EOSINOPHILS 2 0.5 - 7.8 %    BASOPHILS 0 0.0 - 2.0 %    IMMATURE GRANULOCYTES 0 0.0 - 5.0 %    ABS. NEUTROPHILS 6.8 1.7 - 8.2 K/UL    ABS. LYMPHOCYTES 1.6 0.5 - 4.6 K/UL    ABS. MONOCYTES 0.4 0.1 - 1.3 K/UL    ABS. EOSINOPHILS 0.2 0.0 - 0.8 K/UL    ABS. BASOPHILS 0.0 0.0 - 0.2 K/UL    ABS. IMM. GRANS. 0.0 0.0 - 0.5 K/UL   METABOLIC PANEL, COMPREHENSIVE    Collection Time: 08/03/19  7:15 AM   Result Value Ref Range    Sodium 126 (L) 136 - 145 mmol/L    Potassium 5.9 (H) 3.5 - 5.1 mmol/L    Chloride 92 (L) 98 - 107 mmol/L    CO2 19 (L) 21 - 32 mmol/L    Anion gap 15 7 - 16 mmol/L    Glucose 121 (H) 65 - 100 mg/dL    BUN 45 (H) 6 - 23 MG/DL    Creatinine 9.20 (H) 0.6 - 1.0 MG/DL    GFR est AA 6 (L) >60 ml/min/1.73m2    GFR est non-AA 5 (L) >60 ml/min/1.73m2    Calcium 8.9 8.3 - 10.4 MG/DL    Bilirubin, total 0.4 0.2 - 1.1 MG/DL    ALT (SGPT) 21 12 - 65 U/L    AST (SGOT) 14 (L) 15 - 37 U/L    Alk.  phosphatase 60 50 - 136 U/L    Protein, total 7.4 6.3 - 8.2 g/dL    Albumin 3.6 3.5 - 5.0 g/dL    Globulin 3.8 (H) 2.3 - 3.5 g/dL    A-G Ratio 0.9 (L) 1.2 - 3.5

## 2019-08-03 NOTE — ED NOTES
Pt arrived via EMS from home with c/o shob and tingling in feet and hands, difficulty with motor skills. Initial BGL was 60 and repeat 30. EMS gave D10 and repeat was 87.

## 2019-08-03 NOTE — PROGRESS NOTES
Hourly rounds done. Patient has been hypoglycemic most of shift. She is suppose to be NPO for surgery tomorrow. Dr. Jennifer Griffin and Dr. Glendy Thomas were notified about patient's blood sugar, but no changes were made to medication. She was given D50 twice this shift and she ate her lunch and dinner. Patient is NPO at midnight but she wants to talk with doctor before signing consents. Patient has no needs at this time, call light within reach.

## 2019-08-04 ENCOUNTER — ANESTHESIA (OUTPATIENT)
Dept: SURGERY | Age: 44
DRG: 660 | End: 2019-08-04
Payer: COMMERCIAL

## 2019-08-04 LAB
ANION GAP SERPL CALC-SCNC: 10 MMOL/L (ref 7–16)
BUN SERPL-MCNC: 35 MG/DL (ref 6–23)
CALCIUM SERPL-MCNC: 8.2 MG/DL (ref 8.3–10.4)
CHLORIDE SERPL-SCNC: 93 MMOL/L (ref 98–107)
CO2 SERPL-SCNC: 27 MMOL/L (ref 21–32)
CREAT SERPL-MCNC: 5.82 MG/DL (ref 0.6–1)
ERYTHROCYTE [DISTWIDTH] IN BLOOD BY AUTOMATED COUNT: 14.4 % (ref 11.9–14.6)
GLUCOSE BLD STRIP.AUTO-MCNC: 105 MG/DL (ref 65–100)
GLUCOSE BLD STRIP.AUTO-MCNC: 110 MG/DL (ref 65–100)
GLUCOSE BLD STRIP.AUTO-MCNC: 185 MG/DL (ref 65–100)
GLUCOSE BLD STRIP.AUTO-MCNC: 225 MG/DL (ref 65–100)
GLUCOSE BLD STRIP.AUTO-MCNC: 365 MG/DL (ref 65–100)
GLUCOSE BLD STRIP.AUTO-MCNC: 483 MG/DL (ref 65–100)
GLUCOSE BLD STRIP.AUTO-MCNC: 485 MG/DL (ref 65–100)
GLUCOSE BLD STRIP.AUTO-MCNC: 494 MG/DL (ref 65–100)
GLUCOSE BLD STRIP.AUTO-MCNC: 63 MG/DL (ref 65–100)
GLUCOSE BLD STRIP.AUTO-MCNC: 74 MG/DL (ref 65–100)
GLUCOSE BLD STRIP.AUTO-MCNC: 83 MG/DL (ref 65–100)
GLUCOSE BLD STRIP.AUTO-MCNC: 97 MG/DL (ref 65–100)
GLUCOSE SERPL-MCNC: 106 MG/DL (ref 65–100)
HCT VFR BLD AUTO: 26.5 % (ref 35.8–46.3)
HGB BLD-MCNC: 8.7 G/DL (ref 11.7–15.4)
MCH RBC QN AUTO: 26.6 PG (ref 26.1–32.9)
MCHC RBC AUTO-ENTMCNC: 32.8 G/DL (ref 31.4–35)
MCV RBC AUTO: 81 FL (ref 79.6–97.8)
NRBC # BLD: 0 K/UL (ref 0–0.2)
PLATELET # BLD AUTO: 260 K/UL (ref 150–450)
PMV BLD AUTO: 10.9 FL (ref 9.4–12.3)
POTASSIUM SERPL-SCNC: 3.8 MMOL/L (ref 3.5–5.1)
RBC # BLD AUTO: 3.27 M/UL (ref 4.05–5.2)
SODIUM SERPL-SCNC: 130 MMOL/L (ref 136–145)
WBC # BLD AUTO: 5.1 K/UL (ref 4.3–11.1)

## 2019-08-04 PROCEDURE — 74011250636 HC RX REV CODE- 250/636

## 2019-08-04 PROCEDURE — C2617 STENT, NON-COR, TEM W/O DEL: HCPCS | Performed by: UROLOGY

## 2019-08-04 PROCEDURE — 77030032490 HC SLV COMPR SCD KNE COVD -B: Performed by: UROLOGY

## 2019-08-04 PROCEDURE — 74011000258 HC RX REV CODE- 258: Performed by: FAMILY MEDICINE

## 2019-08-04 PROCEDURE — 77030019927 HC TBNG IRR CYSTO BAXT -A: Performed by: UROLOGY

## 2019-08-04 PROCEDURE — 74011000258 HC RX REV CODE- 258: Performed by: HOSPITALIST

## 2019-08-04 PROCEDURE — 94660 CPAP INITIATION&MGMT: CPT

## 2019-08-04 PROCEDURE — 77030021678 HC GLIDESCP STAT DISP VERT -B: Performed by: ANESTHESIOLOGY

## 2019-08-04 PROCEDURE — 0TC78ZZ EXTIRPATION OF MATTER FROM LEFT URETER, VIA NATURAL OR ARTIFICIAL OPENING ENDOSCOPIC: ICD-10-PCS | Performed by: UROLOGY

## 2019-08-04 PROCEDURE — 85027 COMPLETE CBC AUTOMATED: CPT

## 2019-08-04 PROCEDURE — 77030020245 HC SOL INJ 5% D/0.9%NACL

## 2019-08-04 PROCEDURE — 74011000250 HC RX REV CODE- 250: Performed by: FAMILY MEDICINE

## 2019-08-04 PROCEDURE — 65270000029 HC RM PRIVATE

## 2019-08-04 PROCEDURE — 74011250636 HC RX REV CODE- 250/636: Performed by: UROLOGY

## 2019-08-04 PROCEDURE — 76210000006 HC OR PH I REC 0.5 TO 1 HR: Performed by: UROLOGY

## 2019-08-04 PROCEDURE — 82962 GLUCOSE BLOOD TEST: CPT

## 2019-08-04 PROCEDURE — BT1FZZZ FLUOROSCOPY OF LEFT KIDNEY, URETER AND BLADDER: ICD-10-PCS | Performed by: UROLOGY

## 2019-08-04 PROCEDURE — 94760 N-INVAS EAR/PLS OXIMETRY 1: CPT

## 2019-08-04 PROCEDURE — 77030034696 HC CATH URETH FOL 2W BARD -A: Performed by: UROLOGY

## 2019-08-04 PROCEDURE — 77030018836 HC SOL IRR NACL ICUM -A: Performed by: UROLOGY

## 2019-08-04 PROCEDURE — 74011250636 HC RX REV CODE- 250/636: Performed by: INTERNAL MEDICINE

## 2019-08-04 PROCEDURE — 36415 COLL VENOUS BLD VENIPUNCTURE: CPT

## 2019-08-04 PROCEDURE — 74011250637 HC RX REV CODE- 250/637: Performed by: FAMILY MEDICINE

## 2019-08-04 PROCEDURE — 74011250636 HC RX REV CODE- 250/636: Performed by: ANESTHESIOLOGY

## 2019-08-04 PROCEDURE — 0T778DZ DILATION OF LEFT URETER WITH INTRALUMINAL DEVICE, VIA NATURAL OR ARTIFICIAL OPENING ENDOSCOPIC: ICD-10-PCS | Performed by: UROLOGY

## 2019-08-04 PROCEDURE — C1894 INTRO/SHEATH, NON-LASER: HCPCS | Performed by: UROLOGY

## 2019-08-04 PROCEDURE — 77030020263 HC SOL INJ SOD CL0.9% LFCR 1000ML

## 2019-08-04 PROCEDURE — 74011000250 HC RX REV CODE- 250

## 2019-08-04 PROCEDURE — 80048 BASIC METABOLIC PNL TOTAL CA: CPT

## 2019-08-04 PROCEDURE — 77030035011 HC LSR FBR HOLM FLXIVA TRAC TIP BSC -F: Performed by: UROLOGY

## 2019-08-04 PROCEDURE — 77030038846 HC SCPE URETSCP LITHOVUE DISP BSC -H: Performed by: UROLOGY

## 2019-08-04 PROCEDURE — 77030037088 HC TUBE ENDOTRACH ORAL NSL COVD-A: Performed by: ANESTHESIOLOGY

## 2019-08-04 PROCEDURE — 74011636637 HC RX REV CODE- 636/637: Performed by: FAMILY MEDICINE

## 2019-08-04 PROCEDURE — C1769 GUIDE WIRE: HCPCS | Performed by: UROLOGY

## 2019-08-04 PROCEDURE — 76060000034 HC ANESTHESIA 1.5 TO 2 HR: Performed by: UROLOGY

## 2019-08-04 PROCEDURE — 74011250636 HC RX REV CODE- 250/636: Performed by: FAMILY MEDICINE

## 2019-08-04 PROCEDURE — 76010000153 HC OR TIME 1.5 TO 2 HR: Performed by: UROLOGY

## 2019-08-04 DEVICE — URETERAL STENT
Type: IMPLANTABLE DEVICE | Site: URETER | Status: FUNCTIONAL
Brand: PERCUFLEX™ PLUS

## 2019-08-04 RX ORDER — FENTANYL CITRATE 50 UG/ML
INJECTION, SOLUTION INTRAMUSCULAR; INTRAVENOUS AS NEEDED
Status: DISCONTINUED | OUTPATIENT
Start: 2019-08-04 | End: 2019-08-04 | Stop reason: HOSPADM

## 2019-08-04 RX ORDER — NEOSTIGMINE METHYLSULFATE 1 MG/ML
INJECTION, SOLUTION INTRAVENOUS AS NEEDED
Status: DISCONTINUED | OUTPATIENT
Start: 2019-08-04 | End: 2019-08-04 | Stop reason: HOSPADM

## 2019-08-04 RX ORDER — DEXAMETHASONE SODIUM PHOSPHATE 4 MG/ML
INJECTION, SOLUTION INTRA-ARTICULAR; INTRALESIONAL; INTRAMUSCULAR; INTRAVENOUS; SOFT TISSUE AS NEEDED
Status: DISCONTINUED | OUTPATIENT
Start: 2019-08-04 | End: 2019-08-04 | Stop reason: HOSPADM

## 2019-08-04 RX ORDER — FENTANYL CITRATE 50 UG/ML
100 INJECTION, SOLUTION INTRAMUSCULAR; INTRAVENOUS ONCE
Status: DISCONTINUED | OUTPATIENT
Start: 2019-08-04 | End: 2019-08-04 | Stop reason: HOSPADM

## 2019-08-04 RX ORDER — GLYCOPYRROLATE 0.2 MG/ML
INJECTION INTRAMUSCULAR; INTRAVENOUS AS NEEDED
Status: DISCONTINUED | OUTPATIENT
Start: 2019-08-04 | End: 2019-08-04 | Stop reason: HOSPADM

## 2019-08-04 RX ORDER — OXYCODONE HYDROCHLORIDE 5 MG/1
5 TABLET ORAL
Status: DISCONTINUED | OUTPATIENT
Start: 2019-08-04 | End: 2019-08-04 | Stop reason: HOSPADM

## 2019-08-04 RX ORDER — FLUTICASONE PROPIONATE 50 MCG
2 SPRAY, SUSPENSION (ML) NASAL DAILY
Status: DISCONTINUED | OUTPATIENT
Start: 2019-08-04 | End: 2019-08-06 | Stop reason: HOSPADM

## 2019-08-04 RX ORDER — DEXTROSE MONOHYDRATE AND SODIUM CHLORIDE 5; .9 G/100ML; G/100ML
125 INJECTION, SOLUTION INTRAVENOUS CONTINUOUS
Status: DISCONTINUED | OUTPATIENT
Start: 2019-08-04 | End: 2019-08-04

## 2019-08-04 RX ORDER — SUCCINYLCHOLINE CHLORIDE 20 MG/ML
INJECTION INTRAMUSCULAR; INTRAVENOUS AS NEEDED
Status: DISCONTINUED | OUTPATIENT
Start: 2019-08-04 | End: 2019-08-04 | Stop reason: HOSPADM

## 2019-08-04 RX ORDER — LIDOCAINE HYDROCHLORIDE 20 MG/ML
INJECTION, SOLUTION EPIDURAL; INFILTRATION; INTRACAUDAL; PERINEURAL AS NEEDED
Status: DISCONTINUED | OUTPATIENT
Start: 2019-08-04 | End: 2019-08-04 | Stop reason: HOSPADM

## 2019-08-04 RX ORDER — SODIUM CHLORIDE, SODIUM LACTATE, POTASSIUM CHLORIDE, CALCIUM CHLORIDE 600; 310; 30; 20 MG/100ML; MG/100ML; MG/100ML; MG/100ML
75 INJECTION, SOLUTION INTRAVENOUS CONTINUOUS
Status: DISCONTINUED | OUTPATIENT
Start: 2019-08-04 | End: 2019-08-04 | Stop reason: HOSPADM

## 2019-08-04 RX ORDER — PROPOFOL 10 MG/ML
INJECTION, EMULSION INTRAVENOUS AS NEEDED
Status: DISCONTINUED | OUTPATIENT
Start: 2019-08-04 | End: 2019-08-04 | Stop reason: HOSPADM

## 2019-08-04 RX ORDER — INSULIN LISPRO 100 [IU]/ML
10 INJECTION, SOLUTION INTRAVENOUS; SUBCUTANEOUS ONCE
Status: COMPLETED | OUTPATIENT
Start: 2019-08-04 | End: 2019-08-04

## 2019-08-04 RX ORDER — ROCURONIUM BROMIDE 10 MG/ML
INJECTION, SOLUTION INTRAVENOUS AS NEEDED
Status: DISCONTINUED | OUTPATIENT
Start: 2019-08-04 | End: 2019-08-04 | Stop reason: HOSPADM

## 2019-08-04 RX ORDER — ONDANSETRON 2 MG/ML
INJECTION INTRAMUSCULAR; INTRAVENOUS AS NEEDED
Status: DISCONTINUED | OUTPATIENT
Start: 2019-08-04 | End: 2019-08-04 | Stop reason: HOSPADM

## 2019-08-04 RX ORDER — HYDROMORPHONE HYDROCHLORIDE 2 MG/ML
0.5 INJECTION, SOLUTION INTRAMUSCULAR; INTRAVENOUS; SUBCUTANEOUS
Status: DISCONTINUED | OUTPATIENT
Start: 2019-08-04 | End: 2019-08-04 | Stop reason: HOSPADM

## 2019-08-04 RX ORDER — MIDAZOLAM HYDROCHLORIDE 1 MG/ML
2 INJECTION, SOLUTION INTRAMUSCULAR; INTRAVENOUS
Status: COMPLETED | OUTPATIENT
Start: 2019-08-04 | End: 2019-08-04

## 2019-08-04 RX ORDER — CEFAZOLIN SODIUM/WATER 2 G/20 ML
2 SYRINGE (ML) INTRAVENOUS
Status: COMPLETED | OUTPATIENT
Start: 2019-08-04 | End: 2019-08-04

## 2019-08-04 RX ORDER — FLUTICASONE PROPIONATE 50 MCG
2 SPRAY, SUSPENSION (ML) NASAL DAILY
Status: DISCONTINUED | OUTPATIENT
Start: 2019-08-05 | End: 2019-08-04

## 2019-08-04 RX ORDER — LIDOCAINE HYDROCHLORIDE 10 MG/ML
0.1 INJECTION INFILTRATION; PERINEURAL AS NEEDED
Status: DISCONTINUED | OUTPATIENT
Start: 2019-08-04 | End: 2019-08-04 | Stop reason: HOSPADM

## 2019-08-04 RX ORDER — SODIUM CHLORIDE 9 MG/ML
100 INJECTION, SOLUTION INTRAVENOUS CONTINUOUS
Status: DISCONTINUED | OUTPATIENT
Start: 2019-08-04 | End: 2019-08-06 | Stop reason: HOSPADM

## 2019-08-04 RX ORDER — MIDAZOLAM HYDROCHLORIDE 1 MG/ML
2 INJECTION, SOLUTION INTRAMUSCULAR; INTRAVENOUS
Status: DISCONTINUED | OUTPATIENT
Start: 2019-08-04 | End: 2019-08-04 | Stop reason: HOSPADM

## 2019-08-04 RX ORDER — MIDAZOLAM HYDROCHLORIDE 1 MG/ML
5 INJECTION, SOLUTION INTRAMUSCULAR; INTRAVENOUS ONCE
Status: DISCONTINUED | OUTPATIENT
Start: 2019-08-04 | End: 2019-08-04 | Stop reason: HOSPADM

## 2019-08-04 RX ORDER — HYDROCODONE BITARTRATE AND ACETAMINOPHEN 5; 325 MG/1; MG/1
2 TABLET ORAL AS NEEDED
Status: DISCONTINUED | OUTPATIENT
Start: 2019-08-04 | End: 2019-08-04 | Stop reason: HOSPADM

## 2019-08-04 RX ADMIN — SODIUM CHLORIDE: 234 INJECTION, SOLUTION INTRAVENOUS at 12:45

## 2019-08-04 RX ADMIN — SUCCINYLCHOLINE CHLORIDE 200 MG: 20 INJECTION INTRAMUSCULAR; INTRAVENOUS at 09:53

## 2019-08-04 RX ADMIN — FLUTICASONE PROPIONATE 2 SPRAY: 50 SPRAY, METERED NASAL at 18:00

## 2019-08-04 RX ADMIN — Medication 10 ML: at 21:09

## 2019-08-04 RX ADMIN — SODIUM CHLORIDE, SODIUM LACTATE, POTASSIUM CHLORIDE, AND CALCIUM CHLORIDE: 600; 310; 30; 20 INJECTION, SOLUTION INTRAVENOUS at 09:46

## 2019-08-04 RX ADMIN — BISACODYL 5 MG: 5 TABLET, COATED ORAL at 16:09

## 2019-08-04 RX ADMIN — LORAZEPAM 0.5 MG: 0.5 TABLET ORAL at 00:50

## 2019-08-04 RX ADMIN — PROPOFOL 50 MG: 10 INJECTION, EMULSION INTRAVENOUS at 10:55

## 2019-08-04 RX ADMIN — PANTOPRAZOLE SODIUM 40 MG: 40 TABLET, DELAYED RELEASE ORAL at 05:51

## 2019-08-04 RX ADMIN — MIDAZOLAM HYDROCHLORIDE 2 MG: 2 INJECTION, SOLUTION INTRAMUSCULAR; INTRAVENOUS at 08:46

## 2019-08-04 RX ADMIN — GLYCOPYRROLATE 0.4 MG: 0.2 INJECTION INTRAMUSCULAR; INTRAVENOUS at 11:08

## 2019-08-04 RX ADMIN — ACETAMINOPHEN 650 MG: 325 TABLET, FILM COATED ORAL at 07:20

## 2019-08-04 RX ADMIN — FENTANYL CITRATE 25 MCG: 50 INJECTION, SOLUTION INTRAMUSCULAR; INTRAVENOUS at 10:55

## 2019-08-04 RX ADMIN — ROCURONIUM BROMIDE 5 MG: 10 INJECTION, SOLUTION INTRAVENOUS at 09:53

## 2019-08-04 RX ADMIN — HYDROCODONE BITARTRATE AND ACETAMINOPHEN 1 TABLET: 5; 325 TABLET ORAL at 00:50

## 2019-08-04 RX ADMIN — FENTANYL CITRATE 25 MCG: 50 INJECTION, SOLUTION INTRAMUSCULAR; INTRAVENOUS at 10:35

## 2019-08-04 RX ADMIN — NEOSTIGMINE METHYLSULFATE 3 MG: 1 INJECTION, SOLUTION INTRAVENOUS at 11:08

## 2019-08-04 RX ADMIN — SODIUM CHLORIDE 100 ML/HR: 900 INJECTION, SOLUTION INTRAVENOUS at 14:00

## 2019-08-04 RX ADMIN — BUPROPION HYDROCHLORIDE 150 MG: 150 TABLET, EXTENDED RELEASE ORAL at 16:10

## 2019-08-04 RX ADMIN — Medication 10 ML: at 05:50

## 2019-08-04 RX ADMIN — Medication 2 G: at 10:05

## 2019-08-04 RX ADMIN — INSULIN LISPRO 10 UNITS: 100 INJECTION, SOLUTION INTRAVENOUS; SUBCUTANEOUS at 21:17

## 2019-08-04 RX ADMIN — Medication 10 ML: at 14:00

## 2019-08-04 RX ADMIN — DEXTROSE 50 % IN WATER (D50W) INTRAVENOUS SYRINGE 25 G: at 00:40

## 2019-08-04 RX ADMIN — DEXAMETHASONE SODIUM PHOSPHATE 4 MG: 4 INJECTION, SOLUTION INTRA-ARTICULAR; INTRALESIONAL; INTRAMUSCULAR; INTRAVENOUS; SOFT TISSUE at 10:05

## 2019-08-04 RX ADMIN — SODIUM CHLORIDE, SODIUM LACTATE, POTASSIUM CHLORIDE, AND CALCIUM CHLORIDE: 600; 310; 30; 20 INJECTION, SOLUTION INTRAVENOUS at 11:13

## 2019-08-04 RX ADMIN — DULOXETINE HYDROCHLORIDE 60 MG: 60 CAPSULE, DELAYED RELEASE ORAL at 16:10

## 2019-08-04 RX ADMIN — PROPOFOL 200 MG: 10 INJECTION, EMULSION INTRAVENOUS at 09:53

## 2019-08-04 RX ADMIN — HYDROCODONE BITARTRATE AND ACETAMINOPHEN 1 TABLET: 5; 325 TABLET ORAL at 16:35

## 2019-08-04 RX ADMIN — HYDROCODONE BITARTRATE AND ACETAMINOPHEN 1 TABLET: 5; 325 TABLET ORAL at 12:41

## 2019-08-04 RX ADMIN — HYDROCODONE BITARTRATE AND ACETAMINOPHEN 1 TABLET: 5; 325 TABLET ORAL at 19:50

## 2019-08-04 RX ADMIN — DEXTROSE MONOHYDRATE AND SODIUM CHLORIDE 125 ML/HR: 5; .9 INJECTION, SOLUTION INTRAVENOUS at 03:47

## 2019-08-04 RX ADMIN — INSULIN LISPRO 10 UNITS: 100 INJECTION, SOLUTION INTRAVENOUS; SUBCUTANEOUS at 16:30

## 2019-08-04 RX ADMIN — FENTANYL CITRATE 50 MCG: 50 INJECTION, SOLUTION INTRAMUSCULAR; INTRAVENOUS at 09:53

## 2019-08-04 RX ADMIN — LIDOCAINE HYDROCHLORIDE 100 MG: 20 INJECTION, SOLUTION EPIDURAL; INFILTRATION; INTRACAUDAL; PERINEURAL at 09:53

## 2019-08-04 RX ADMIN — ROCURONIUM BROMIDE 25 MG: 10 INJECTION, SOLUTION INTRAVENOUS at 10:02

## 2019-08-04 RX ADMIN — INSULIN LISPRO 10 UNITS: 100 INJECTION, SOLUTION INTRAVENOUS; SUBCUTANEOUS at 18:01

## 2019-08-04 RX ADMIN — ATORVASTATIN CALCIUM 40 MG: 40 TABLET, FILM COATED ORAL at 21:08

## 2019-08-04 RX ADMIN — ONDANSETRON 4 MG: 2 INJECTION INTRAMUSCULAR; INTRAVENOUS at 10:05

## 2019-08-04 NOTE — PROGRESS NOTES
Pt blood glucose has been dropping this shift. Offered juice and crackers x 2. Blood glucose 60 at 0030 and D50 was administered per [facility]. Pt blood glucose rechecked at:  0200 blood glucose 97.  0300 blood glucose 74.

## 2019-08-04 NOTE — PROGRESS NOTES
Urology Progress Note    Admit Date: 8/3/2019    Subjective:     Patient has no new complaints this AM.  Pain controlled. Cr down to 5.8 but still above baseline for 2-3. KUB showed no visible stone but CT shows 9 mm L renal pelvis stone which has about a 10 percent chance of spontaneous passage. NPO for ureteroscopy today. Objective:     Patient Vitals for the past 8 hrs:   BP Temp Pulse Resp SpO2 Weight   08/04/19 0755 113/79 98.2 °F (36.8 °C) 85 18 100 %    08/04/19 0726 111/78 98.1 °F (36.7 °C) 83 18 99 %    08/04/19 0605      232 lb 8 oz (105.5 kg)   08/04/19 0450 122/74 98 °F (36.7 °C) 87 18 95 %    08/04/19 0021     97 %      No intake/output data recorded. No intake/output data recorded. Physical Exam:   Visit Vitals  /79 (BP 1 Location: Left arm, BP Patient Position: At rest)   Pulse 85   Temp 98.2 °F (36.8 °C)   Resp 18   Ht 5' 5\" (1.651 m)   Wt 232 lb 8 oz (105.5 kg)   SpO2 100%   BMI 38.69 kg/m²        GENERAL: No acute distress, Awake, Alert, Oriented X 3  CARDIAC: regular rate and rhythm  CHEST AND LUNG: Easy work of breathin  ABDOMEN: soft, non tender, non-distended  BACK: No CVA TTP bilaterally.    NEUROLOGIC: cranial nerves 2-12 grossly intact         Data Review   Recent Results (from the past 24 hour(s))   HCG URINE, QL. - POC    Collection Time: 08/03/19  8:59 AM   Result Value Ref Range    Pregnancy test,urine (POC) NEGATIVE  NEG     GLUCOSE, POC    Collection Time: 08/03/19 11:22 AM   Result Value Ref Range    Glucose (POC) 27 (LL) 65 - 100 mg/dL   GLUCOSE, POC    Collection Time: 08/03/19 12:01 PM   Result Value Ref Range    Glucose (POC) 99 65 - 100 mg/dL   GLUCOSE, POC    Collection Time: 08/03/19  3:55 PM   Result Value Ref Range    Glucose (POC) 46 (L) 65 - 100 mg/dL   GLUCOSE, POC    Collection Time: 08/03/19  4:30 PM   Result Value Ref Range    Glucose (POC) 71 65 - 100 mg/dL   LACTIC ACID    Collection Time: 08/03/19  4:55 PM   Result Value Ref Range Lactic acid 1.3 0.4 - 2.0 MMOL/L   POTASSIUM    Collection Time: 08/03/19  4:55 PM   Result Value Ref Range    Potassium 4.1 3.5 - 5.1 mmol/L   CK    Collection Time: 08/03/19  5:25 PM   Result Value Ref Range     (H) 21 - 215 U/L   PHOSPHORUS    Collection Time: 08/03/19  5:25 PM   Result Value Ref Range    Phosphorus 5.8 (H) 2.5 - 4.5 MG/DL   GLUCOSE, POC    Collection Time: 08/03/19  6:16 PM   Result Value Ref Range    Glucose (POC) 93 65 - 100 mg/dL   URINALYSIS W/ RFLX MICROSCOPIC    Collection Time: 08/03/19  6:29 PM   Result Value Ref Range    Color YELLOW      Appearance CLEAR      Specific gravity 1.005 1.001 - 1.023      pH (UA) 5.0 5.0 - 9.0      Protein TRACE (A) NEG mg/dL    Glucose NEGATIVE  mg/dL    Ketone NEGATIVE  NEG mg/dL    Bilirubin NEGATIVE  NEG      Blood MODERATE (A) NEG      Urobilinogen 0.2 0.2 - 1.0 EU/dL    Nitrites NEGATIVE  NEG      Leukocyte Esterase NEGATIVE  NEG      WBC 0-3 0 /hpf    RBC 0-3 0 /hpf    Epithelial cells 0-3 0 /hpf    Bacteria 1+ (H) 0 /hpf    Casts HYALINE 0 /lpf    Other observations RESULTS VERIFIED MANUALLY     PROTEIN/CREATININE RATIO, URINE    Collection Time: 08/03/19  6:29 PM   Result Value Ref Range    Protein, urine random 40 (H) <11.9 mg/dL    Creatinine, urine 50.90 mg/dL    Protein/Creat.  urine Ratio 0.8     GLUCOSE, POC    Collection Time: 08/03/19  8:13 PM   Result Value Ref Range    Glucose (POC) 48 (L) 65 - 100 mg/dL   GLUCOSE, POC    Collection Time: 08/03/19  9:34 PM   Result Value Ref Range    Glucose (POC) 75 65 - 100 mg/dL   GLUCOSE, POC    Collection Time: 08/03/19 11:11 PM   Result Value Ref Range    Glucose (POC) 94 65 - 100 mg/dL   GLUCOSE, POC    Collection Time: 08/04/19 12:35 AM   Result Value Ref Range    Glucose (POC) 63 (L) 65 - 100 mg/dL   GLUCOSE, POC    Collection Time: 08/04/19  1:54 AM   Result Value Ref Range    Glucose (POC) 97 65 - 100 mg/dL   GLUCOSE, POC    Collection Time: 08/04/19  3:06 AM   Result Value Ref Range Glucose (POC) 74 65 - 100 mg/dL   GLUCOSE, POC    Collection Time: 08/04/19  4:50 AM   Result Value Ref Range    Glucose (POC) 83 65 - 100 mg/dL   GLUCOSE, POC    Collection Time: 08/04/19  6:57 AM   Result Value Ref Range    Glucose (POC) 105 (H) 65 - 100 mg/dL   CBC W/O DIFF    Collection Time: 08/04/19  7:05 AM   Result Value Ref Range    WBC 5.1 4.3 - 11.1 K/uL    RBC 3.27 (L) 4.05 - 5.2 M/uL    HGB 8.7 (L) 11.7 - 15.4 g/dL    HCT 26.5 (L) 35.8 - 46.3 %    MCV 81.0 79.6 - 97.8 FL    MCH 26.6 26.1 - 32.9 PG    MCHC 32.8 31.4 - 35.0 g/dL    RDW 14.4 11.9 - 14.6 %    PLATELET 136 091 - 146 K/uL    MPV 10.9 9.4 - 12.3 FL    ABSOLUTE NRBC 0.00 0.0 - 0.2 K/uL   METABOLIC PANEL, BASIC    Collection Time: 08/04/19  7:05 AM   Result Value Ref Range    Sodium 130 (L) 136 - 145 mmol/L    Potassium 3.8 3.5 - 5.1 mmol/L    Chloride 93 (L) 98 - 107 mmol/L    CO2 27 21 - 32 mmol/L    Anion gap 10 7 - 16 mmol/L    Glucose 106 (H) 65 - 100 mg/dL    BUN 35 (H) 6 - 23 MG/DL    Creatinine 5.82 (H) 0.6 - 1.0 MG/DL    GFR est AA 10 (L) >60 ml/min/1.73m2    GFR est non-AA 8 (L) >60 ml/min/1.73m2    Calcium 8.2 (L) 8.3 - 10.4 MG/DL     CT A/P 8/3/19   IMPRESSION: 9 mm calculus in the left renal pelvis. Minimal perinephric  stranding without hydronephrosis.           Assessment:     Principal Problem:    Acute renal failure (ARF) ([address] (8/3/2019)    Active Problems:    HTN (hypertension) (10/3/2013)      MARILY (obstructive sleep apnea) (1/7/2016)      Mixed hyperlipidemia (5/9/2017)      Obesity, morbid ([address] (5/1/2018)      Type 2 diabetes with nephropathy ([address] (5/1/2018)      Renal stone (8/3/2019)      HD 2 admitted for PANCHO (Cr 9) and L 9 mm renal pelvis stone. Plan:     -To OR for cystoscopy, LEFT Ureteroscopy, Laser Lithotripsy, LEFT ureteral stent placement today    I reviewed the risks/benefits/alternatives for stone treatment today in detail with the patient.  Treatment options discussed include medical expulsive therapy (MET) vs. ESWL vs. Ureteroscopy/laser lithotropsy vs. PCNL. After our discussion, the patient would like to proceed with LEFT Ureteroscopy/Laser Lithotripsy/Basket Extraction of Stone/Stent Placement. Risks of ureteroscopy that we discussed were bleeding, infection, injury to the bladder/ureter/kidney and surrounding structures, incomplete fragmentation of stones, steinstrasse, inability to pass stone fragments requiring additional operative intervention in the form of second look ureteroscopy/laser lithotripsy and/or stent placement, ureteral stricture, stent migration, stent pain, urinary retention, risks of general anesthesia, recurrent stones. The patient expressed understanding of the above. -NPO for surgery  -Consented  -Antibiotics on call to [facility]. Andrea Arnold M.D.     [facility]  [facility]  Phone: [phone]  Fax: [phone]

## 2019-08-04 NOTE — BRIEF OP NOTE
BRIEF OPERATIVE NOTE    Date of Procedure: 8/4/2019     Preoperative Diagnosis: left ureteral stone    Postoperative Diagnosis: left ureteral stone      Procedure(s):  cystoscopy/left ureteroscopy/ laser lithotripsy/basket of stone/retrograde pyelogram/left stent placement/maynard catheter placement, intra-operative interpretation of fluoroscopy < 1 hour. Surgeon(s) and Role:     * Jhonny Shafer MD - Primary         Surgical Assistant: None    Surgical Staff:  Circ-1: Sonido Abdullahi RN  Event Time In Time Out   Incision Start 08/04/2019 1015    Incision Close 08/04/2019 1105      Anesthesia: General     Estimated Blood Loss: 1 cc    Specimens: Kidney Stone - Analysis    Findings: 9 mm L UPJ stone fragmented/removed. Complications: None    Implants: Placement of 6 X 24 JJ left ureteral stent with small remnant of string.

## 2019-08-04 NOTE — ANESTHESIA POSTPROCEDURE EVALUATION
Procedure(s):  cystoscopy/left ureteroscopy/ laser lithotripsy/basket of stone/retrograde pyelogram/left stent placent. general    Anesthesia Post Evaluation      Multimodal analgesia: multimodal analgesia used between 6 hours prior to anesthesia start to PACU discharge  Patient location during evaluation: bedside  Patient participation: complete - patient participated  Level of consciousness: awake and alert  Pain score: 3  Pain management: adequate  Airway patency: patent  Anesthetic complications: no  Cardiovascular status: acceptable and hemodynamically stable  Respiratory status: acceptable  Hydration status: acceptable  Post anesthesia nausea and vomiting:  none      Vitals Value Taken Time   /84 8/4/2019 12:10 PM   Temp 36.7 °C (98.1 °F) 8/4/2019 12:06 PM   Pulse 99 8/4/2019 12:10 PM   Resp 18 8/4/2019 12:06 PM   SpO2 94 % 8/4/2019 12:10 PM   Vitals shown include unvalidated device data.

## 2019-08-04 NOTE — PROGRESS NOTES
Admit Date: 8/3/2019      Subjective:       Patient is a 36yo F with a history of CKD III ( seen at our office ), DM on  metformin, HTN, and anxiety who presents with episode of confusion, shaking, with  Hypoglycemia with BS down to 30's at home.  Received oral and IV dextrose and blood sugars improved.    Found to have acute renal failure with cr  Up to 9.20.     Notion of 3 months unintentional 30 pounds weight loss and nausea. No abdominal pain. No difficulty of urination but decrease in urine output, no gross hematuria, no dysuria. No skin rash, no arthralgia, no fever, no myalgia   Pt has been drinking plenty of fluid  Treated recently for UTI with Cipro    Review of Systems  S/p lithotripsy today  Pain     Objective:     Patient Vitals for the past 8 hrs:   BP Temp Pulse Resp SpO2 Weight   08/04/19 1206 128/90 98.1 °F (36.7 °C) 90 18 99 %    08/04/19 1201 122/86  94 16 98 %    08/04/19 1156 124/86  98 18 97 %    08/04/19 1151 126/89  93 18 97 %    08/04/19 1146 135/87  89 16 96 %    08/04/19 1141 140/87  95 18 92 %    08/04/19 1137 138/89 97.7 °F (36.5 °C) 88 16 97 %    08/04/19 0755 113/79 98.2 °F (36.8 °C) 85 18 100 %    08/04/19 0726 111/78 98.1 °F (36.7 °C) 83 18 99 %    08/04/19 0605      105.5 kg (232 lb 8 oz)     08/04 0701 - 08/04 1900  In: 1500 [I.V.:1500]  Out: -       Physical Exam:   Lungs: clear  CV: RR, no JVD  Abdomen: soft,  tender, no rebound.   Ext: no edema          Data Review   Recent Results (from the past 8 hour(s))   GLUCOSE, POC    Collection Time: 08/04/19  6:57 AM   Result Value Ref Range    Glucose (POC) 105 (H) 65 - 100 mg/dL   CBC W/O DIFF    Collection Time: 08/04/19  7:05 AM   Result Value Ref Range    WBC 5.1 4.3 - 11.1 K/uL    RBC 3.27 (L) 4.05 - 5.2 M/uL    HGB 8.7 (L) 11.7 - 15.4 g/dL    HCT 26.5 (L) 35.8 - 46.3 %    MCV 81.0 79.6 - 97.8 FL    MCH 26.6 26.1 - 32.9 PG    MCHC 32.8 31.4 - 35.0 g/dL    RDW 14.4 11.9 - 14.6 %    PLATELET 173 011 - 496 K/uL MPV 10.9 9.4 - 12.3 FL    ABSOLUTE NRBC 0.00 0.0 - 0.2 K/uL   METABOLIC PANEL, BASIC    Collection Time: 08/04/19  7:05 AM   Result Value Ref Range    Sodium 130 (L) 136 - 145 mmol/L    Potassium 3.8 3.5 - 5.1 mmol/L    Chloride 93 (L) 98 - 107 mmol/L    CO2 27 21 - 32 mmol/L    Anion gap 10 7 - 16 mmol/L    Glucose 106 (H) 65 - 100 mg/dL    BUN 35 (H) 6 - 23 MG/DL    Creatinine 5.82 (H) 0.6 - 1.0 MG/DL    GFR est AA 10 (L) >60 ml/min/1.73m2    GFR est non-AA 8 (L) >60 ml/min/1.73m2    Calcium 8.2 (L) 8.3 - 10.4 MG/DL   GLUCOSE, POC    Collection Time: 08/04/19  8:11 AM   Result Value Ref Range    Glucose (POC) 110 (H) 65 - 100 mg/dL   GLUCOSE, POC    Collection Time: 08/04/19 12:39 PM   Result Value Ref Range    Glucose (POC) 185 (H) 65 - 100 mg/dL           Assessment:     Principal Problem:    Acute renal failure (ARF) (HCC) (8/3/2019)    Active Problems:    HTN (hypertension) (10/3/2013)      MARILY (obstructive sleep apnea) (1/7/2016)      Mixed hyperlipidemia (5/9/2017)      Obesity, morbid ([facility] (5/1/2018)      Type 2 diabetes with nephropathy ([facility] (5/1/2018)      Renal stone (8/3/2019)        Plan:     PANCHO: creat down into 5's with hydration  S/p stone removal  F/u    Capri Lea MD

## 2019-08-04 NOTE — PROGRESS NOTES
MD contacted about pt continuous drop in blood glucose despite offering snacks and juice x 2 and administering D50. Orders for D5 0.9 received.

## 2019-08-04 NOTE — PROGRESS NOTES
Hospitalist Progress Note    2019  Admit Date: 8/3/2019  7:00 AM   NAME: Kobe Rees   :  1975   MRN:  [id number]   Attending: Kassidy Crawley MD  PCP:  Khari Son MD    SUBJECTIVE:   Patient is a 36yo F with hx DM, HTN, anxiety who presented after an episode of shaking, confusion, hypoglycemia at home. Noted to be in acute renal failure with stone in L renal pelvis. : continued to have hypoglycemia overnight despite no antihyperglycemics for over 24h. Repeated bolus d50 - started d10 while NPO  S/p cystoscopy/stent/lithotrypsy, no complications  Cr improving from 9.2 to 5.82    Review of Systems negative with exception of pertinent positives noted above  PHYSICAL EXAM     Visit Vitals  /74 (BP 1 Location: Left arm, BP Patient Position: At rest)   Pulse 87   Temp 98 °F (36.7 °C)   Resp 18   Ht 5' 5\" (1.651 m)   Wt 105.5 kg (232 lb 8 oz)   SpO2 95%   BMI 38.69 kg/m²      Temp (24hrs), Av °F (36.7 °C), Min:97.7 °F (36.5 °C), Max:98.2 °F (36.8 °C)    Oxygen Therapy  O2 Sat (%): 95 % (19 0450)  Pulse via Oximetry: 84 beats per minute (19 0021)  O2 Device: Room air (19 0021)  No intake or output data in the 24 hours ending 19 0724   General: No acute distress, anxious    Lungs:  CTA Bilaterally. Heart:  Regular rate and rhythm,  No murmur, rub, or gallop  Abdomen: Soft, Non distended, Non tender, Positive bowel sounds  Extremities: No cyanosis, clubbing or edema  Neurologic:  No focal deficits    XR ABD (KUB)   Final Result   IMPRESSION: Kidney stone noted on CT is not visible on the plain film. CT UROGRAM WO CONT   Final Result   IMPRESSION: 9 mm calculus in the left renal pelvis. Minimal perinephric   stranding without hydronephrosis. XR CHEST PORT   Final Result   IMPRESSION:    1.   No acute cardiopulmonary process evident on single frontal view of the   chest.                     ASSESSMENT      Active Hospital Problems    Diagnosis Date Noted    [facility] ([facility]F) [facility] 08/03/2019    Renal stone 08/03/2019    Type 2 diabetes with nephropathy ([facility] 05/01/2018    Obesity, morbid ([facility] 05/01/2018    Mixed hyperlipidemia 05/09/2017    MARILY (obstructive sleep apnea) 01/07/2016    HTN (hypertension) 10/03/2013     Plan:    Renal stone: urology following, POD #0 stent/lithotripsy      ARF, hyponatremia, hyperkalemia: Not immediately clear if stone is causing ARF or if there is medical renal disease. Urology and nephrology following. Fluids, I&O. Hold metformin, losartan, hctz. No ekg changes. Improving, continue to monitor. Continue fluids.      DM: hypoglycemia accounts for initial presentation, improved but has been persistently trending low. Not on home insulin. Hold glyburide. Added D10 while NPO. Hypoglycemia protocol. Prior medications would not explain continued hypoglycemia     HTN: holding home antihypertensives due to ARF.  Adjust as needed.      MARILY: cpap     HLD: home meds     Anxiety: home meds    DVT Prophylaxis: HSQ  Dispo: pending    Signed By: Diana Benjamin MD     August 4, 2019

## 2019-08-04 NOTE — PROGRESS NOTES
Patient . 10 units was given and rechecked patient 20 minutes later BS was 485. Dr. Adelina Thibodeaux was paged, 10 additional units ordered.

## 2019-08-04 NOTE — PROGRESS NOTES
08/04/19 0021   Oxygen Therapy   O2 Sat (%) 97 %   Pulse via Oximetry 84 beats per minute   O2 Device Room air     Patient declined to wear hospital CPAP. Pt stable and resting on RA.

## 2019-08-04 NOTE — PERIOP NOTES
TRANSFER - IN REPORT:    Verbal report received from LYNDSAY Perkins on Sunday Dorcas Arrieta being received from [phone] for ordered procedure      Report consisted of patients Situation, Background, Assessment and Recommendations(SBAR). Information from the following report(s) SBAR, Kardex, [facility], Recent Results and Procedure Verification was reviewed with the receiving nurse. Opportunity for questions and clarification was provided. Assessment completed upon patients arrival to unit and care assumed.

## 2019-08-04 NOTE — PERIOP NOTES
TRANSFER - OUT REPORT:    Verbal report given to [facility] RN on Sunday Fabiola Marroquin  being transferred to [facility] for routine post - op       Report consisted of patients Situation, Background, Assessment and   Recommendations(SBAR). Information from the following report(s) SBAR, OR Summary, Procedure Summary, Intake/Output and MAR was reviewed with the receiving nurse. Lines:   Peripheral IV 08/04/19 Left;Posterior Hand (Active)   Site Assessment Clean, dry, & intact 8/4/2019 12:06 PM   Phlebitis Assessment 0 8/4/2019 12:06 PM   Infiltration Assessment 0 8/4/2019 12:06 PM   Dressing Status Clean, dry, & intact; Occlusive 8/4/2019 12:06 PM   Dressing Type Transparent;Tape 8/4/2019 12:06 PM   Hub Color/Line Status Pink;Patent 8/4/2019 12:06 PM   Alcohol Cap Used No 8/4/2019 12:06 PM        Opportunity for questions and clarification was provided. Patient transported with:   O2 @ 4 liters    VTE prophylaxis orders have been written for Sunday ЮЛИЯ Hernandez. Patient and family given floor number and nurses name. Family updated re: pt status after security code verified. You received a medication, called Sugammadex, in the OR; this medication has been known to interfere with oral contraceptives (birth control pills). Please use a back up birth control method for a minimum of 7 days.

## 2019-08-05 LAB
ANION GAP SERPL CALC-SCNC: 10 MMOL/L (ref 7–16)
BUN SERPL-MCNC: 33 MG/DL (ref 6–23)
CALCIUM SERPL-MCNC: 8.1 MG/DL (ref 8.3–10.4)
CHLORIDE SERPL-SCNC: 95 MMOL/L (ref 98–107)
CO2 SERPL-SCNC: 27 MMOL/L (ref 21–32)
CREAT SERPL-MCNC: 3.8 MG/DL (ref 0.6–1)
GLUCOSE BLD STRIP.AUTO-MCNC: 111 MG/DL (ref 65–100)
GLUCOSE BLD STRIP.AUTO-MCNC: 177 MG/DL (ref 65–100)
GLUCOSE BLD STRIP.AUTO-MCNC: 232 MG/DL (ref 65–100)
GLUCOSE BLD STRIP.AUTO-MCNC: 234 MG/DL (ref 65–100)
GLUCOSE BLD STRIP.AUTO-MCNC: 235 MG/DL (ref 65–100)
GLUCOSE BLD STRIP.AUTO-MCNC: 256 MG/DL (ref 65–100)
GLUCOSE BLD STRIP.AUTO-MCNC: 30 MG/DL (ref 65–100)
GLUCOSE SERPL-MCNC: 213 MG/DL (ref 65–100)
POTASSIUM SERPL-SCNC: 3.4 MMOL/L (ref 3.5–5.1)
SODIUM SERPL-SCNC: 132 MMOL/L (ref 136–145)

## 2019-08-05 PROCEDURE — 74011250636 HC RX REV CODE- 250/636: Performed by: INTERNAL MEDICINE

## 2019-08-05 PROCEDURE — 65270000029 HC RM PRIVATE

## 2019-08-05 PROCEDURE — 77030020263 HC SOL INJ SOD CL0.9% LFCR 1000ML

## 2019-08-05 PROCEDURE — 74011250637 HC RX REV CODE- 250/637: Performed by: FAMILY MEDICINE

## 2019-08-05 PROCEDURE — 74011636637 HC RX REV CODE- 636/637: Performed by: FAMILY MEDICINE

## 2019-08-05 PROCEDURE — 77030027138 HC INCENT SPIROMETER -A

## 2019-08-05 PROCEDURE — 82962 GLUCOSE BLOOD TEST: CPT

## 2019-08-05 PROCEDURE — 74011250637 HC RX REV CODE- 250/637: Performed by: UROLOGY

## 2019-08-05 PROCEDURE — 74011250637 HC RX REV CODE- 250/637: Performed by: INTERNAL MEDICINE

## 2019-08-05 PROCEDURE — 80048 BASIC METABOLIC PNL TOTAL CA: CPT

## 2019-08-05 PROCEDURE — 36415 COLL VENOUS BLD VENIPUNCTURE: CPT

## 2019-08-05 RX ORDER — POTASSIUM CHLORIDE 20 MEQ/1
40 TABLET, EXTENDED RELEASE ORAL
Status: COMPLETED | OUTPATIENT
Start: 2019-08-05 | End: 2019-08-05

## 2019-08-05 RX ADMIN — INSULIN LISPRO 4 UNITS: 100 INJECTION, SOLUTION INTRAVENOUS; SUBCUTANEOUS at 11:20

## 2019-08-05 RX ADMIN — INSULIN LISPRO 4 UNITS: 100 INJECTION, SOLUTION INTRAVENOUS; SUBCUTANEOUS at 22:28

## 2019-08-05 RX ADMIN — SODIUM CHLORIDE 100 ML/HR: 900 INJECTION, SOLUTION INTRAVENOUS at 22:26

## 2019-08-05 RX ADMIN — Medication 10 ML: at 05:10

## 2019-08-05 RX ADMIN — ATORVASTATIN CALCIUM 40 MG: 40 TABLET, FILM COATED ORAL at 22:27

## 2019-08-05 RX ADMIN — INSULIN LISPRO 2 UNITS: 100 INJECTION, SOLUTION INTRAVENOUS; SUBCUTANEOUS at 17:08

## 2019-08-05 RX ADMIN — DULOXETINE HYDROCHLORIDE 60 MG: 60 CAPSULE, DELAYED RELEASE ORAL at 08:59

## 2019-08-05 RX ADMIN — INSULIN LISPRO 6 UNITS: 100 INJECTION, SOLUTION INTRAVENOUS; SUBCUTANEOUS at 08:59

## 2019-08-05 RX ADMIN — PHENOL 1 SPRAY: 1.5 LIQUID ORAL at 09:08

## 2019-08-05 RX ADMIN — HYDROCODONE BITARTRATE AND ACETAMINOPHEN 1 TABLET: 5; 325 TABLET ORAL at 02:03

## 2019-08-05 RX ADMIN — HYDROCODONE BITARTRATE AND ACETAMINOPHEN 1 TABLET: 5; 325 TABLET ORAL at 17:09

## 2019-08-05 RX ADMIN — PANTOPRAZOLE SODIUM 40 MG: 40 TABLET, DELAYED RELEASE ORAL at 05:11

## 2019-08-05 RX ADMIN — HYDROCODONE BITARTRATE AND ACETAMINOPHEN 1 TABLET: 5; 325 TABLET ORAL at 11:19

## 2019-08-05 RX ADMIN — BUPROPION HYDROCHLORIDE 150 MG: 150 TABLET, EXTENDED RELEASE ORAL at 17:09

## 2019-08-05 RX ADMIN — SODIUM CHLORIDE 100 ML/HR: 900 INJECTION, SOLUTION INTRAVENOUS at 12:33

## 2019-08-05 RX ADMIN — BISACODYL 5 MG: 5 TABLET, COATED ORAL at 17:11

## 2019-08-05 RX ADMIN — Medication 10 ML: at 14:08

## 2019-08-05 RX ADMIN — Medication 10 ML: at 22:31

## 2019-08-05 RX ADMIN — HYDROCODONE BITARTRATE AND ACETAMINOPHEN 1 TABLET: 5; 325 TABLET ORAL at 22:53

## 2019-08-05 RX ADMIN — BUPROPION HYDROCHLORIDE 150 MG: 150 TABLET, EXTENDED RELEASE ORAL at 08:59

## 2019-08-05 RX ADMIN — BISACODYL 5 MG: 5 TABLET, COATED ORAL at 11:20

## 2019-08-05 RX ADMIN — POTASSIUM CHLORIDE 40 MEQ: 20 TABLET, EXTENDED RELEASE ORAL at 14:08

## 2019-08-05 RX ADMIN — HYDROCODONE BITARTRATE AND ACETAMINOPHEN 1 TABLET: 5; 325 TABLET ORAL at 06:29

## 2019-08-05 NOTE — PROGRESS NOTES
Hospitalist Progress Note    2019  Admit Date: 8/3/2019  7:00 AM   NAME: Kobesong Sheehan   :  1975   MRN:  [id number]   Attending: Tyler Mckeon MD  PCP:  Julianna Vogel MD    SUBJECTIVE:   Patient is a 36yo F with hx DM, HTN, anxiety who presented after an episode of shaking, confusion, hypoglycemia at home. Noted to be in acute renal failure with stone in L renal pelvis. Cystoscopy/stent/lithotrypsy : good uop, blood sugar now elevated. Cr continues to improve. Some sore throat and pelvic soreness after procedure. Tolerating diet. Review of Systems negative with exception of pertinent positives noted above  PHYSICAL EXAM     Visit Vitals  /87 (BP 1 Location: Right arm, BP Patient Position: At rest)   Pulse 95   Temp 98.3 °F (36.8 °C)   Resp 16   Ht 5' 5\" (1.651 m)   Wt 106.1 kg (233 lb 12.8 oz)   SpO2 94%   BMI 38.91 kg/m²      Temp (24hrs), Av °F (36.7 °C), Min:97.6 °F (36.4 °C), Max:98.3 °F (36.8 °C)    Oxygen Therapy  O2 Sat (%): 94 % (19)  Pulse via Oximetry: 98 beats per minute (19)  O2 Device: CPAP mask (19)  O2 Flow Rate (L/min): 4 l/min (19 1206)  FIO2 (%): 21 % (19)    Intake/Output Summary (Last 24 hours) at 2019 0737  Last data filed at 2019 0519  Gross per 24 hour   Intake 1740 ml   Output 5900 ml   Net -4160 ml      General: No acute distress, anxious    Lungs:  CTA Bilaterally. Heart:  Regular rate and rhythm,  No murmur, rub, or gallop  Abdomen: Soft, Non distended, Non tender, Positive bowel sounds  Extremities: No cyanosis, clubbing or edema  Neurologic:  No focal deficits    XR ABD (KUB)   Final Result   IMPRESSION: Kidney stone noted on CT is not visible on the plain film. CT UROGRAM WO CONT   Final Result   IMPRESSION: 9 mm calculus in the left renal pelvis. Minimal perinephric   stranding without hydronephrosis. XR CHEST PORT   Final Result   IMPRESSION:    1.   No acute cardiopulmonary process evident on single frontal view of the   chest.                     ASSESSMENT      Active Hospital Problems    Diagnosis Date Noted    Acute renal failure (ARF) ([facility] 08/03/2019    Renal stone 08/03/2019    Type 2 diabetes with nephropathy ([facility] 05/01/2018    Obesity, morbid ([facility] 05/01/2018    Mixed hyperlipidemia 05/09/2017    MARILY (obstructive sleep apnea) 01/07/2016    HTN (hypertension) 10/03/2013     Plan:    Renal stone: POD #2 stent/lithotripsy. Stent in place, will f/u with uro as outpt     ARF, hyponatremia, hyperkalemia:   Obstruction from stone v medical disease. Cr improving quickly on fluids. Nephro following. Na improving, K now low (replaced). Hold metformin, losartan, hctz.      DM: hypoglycemia on arrival, now resolved. Plan to restart glyburide alone at discharge, restart metformin at f/u if kidney injury resolved.     HTN: holding home antihypertensives due to ARF.  Adjust as needed.      MARILY: cpap     HLD: home meds     Anxiety: home meds    DVT Prophylaxis: HSQ  Dispo: likely home tomorrow    Signed By: Zaki Diggs MD     August 5, 2019

## 2019-08-05 NOTE — PROGRESS NOTES
Hourly round completed throughout the shift. Pt BG  trading down from 483  to 225    Complain of pain x 2 and medicated per [facility]    Denies any needs at this time Bed in lower position and call light/ personal items within reach. Will continue to monitor and give bed side shift report to on coming day shift nurse. END OF SHIFT NOTE:    INTAKE/OUTPUT  08/04 0701 - 08/05 0700  In: 8617 [P.O.:240; I.V.:1500]  Out: 4550 [Urine:4550]  Voiding: YES  Catheter: YES  Color: LIGHT PINK  Drain:      DIET  RENAL    Flatus: Patient does  have flatus present. Stool:  0 occurrences. Characteristics:       Ambulating  YES    Emesis: 0 occurrences.     Characteristics:          VITAL SIGNS  Patient Vitals for the past 12 hrs:   Temp Pulse Resp BP SpO2   08/04/19 2331 97.8 °F (36.6 °C) 94 16 119/85 97 %   08/04/19 2109     96 %   08/04/19 1933  100 18 125/87 95 %       Pain Assessment  Pain Intensity 1: (P) 6 (08/05/19 0203)  Pain Location 1: (P) Back  Pain Intervention(s) 1: Medication (see MAR)  Patient Stated Pain Goal: (P) 0            Dony Stratton

## 2019-08-05 NOTE — PROGRESS NOTES
Urology Progress Note    Admit Date: 8/3/2019    Subjective:     Patient reports stent discomfort this AM.  Also soreness of throat from ETT. Cr pending. Afebrile. Tolerating diet. Ortiz draining clear yellow urine. Objective:     Patient Vitals for the past 8 hrs:   BP Temp Pulse Resp SpO2 Weight   08/05/19 0516      233 lb 12.8 oz (106.1 kg)   08/05/19 0424 128/71 97.6 °F (36.4 °C) 97 16 95 %    08/04/19 2331 119/85 97.8 °F (36.6 °C) 94 16 97 %      08/04 1901 - 08/05 0700  In: 240 [P.O.:240]  Out: 4200 [Urine:4200]  08/03 0701 - 08/04 1900  In: 1500 [I.V.:1500]  Out: 1700 [Urine:1700]    Physical Exam:   Visit Vitals  /71 (BP 1 Location: Right arm, BP Patient Position: At rest)   Pulse 97   Temp 97.6 °F (36.4 °C)   Resp 16   Ht 5' 5\" (1.651 m)   Wt 233 lb 12.8 oz (106.1 kg)   SpO2 95%   BMI 38.91 kg/m²        GENERAL: No acute distress, Awake, Alert, Oriented X 3  CARDIAC: regular rate and rhythm  CHEST AND LUNG: Easy work of breathin  ABDOMEN: soft, non tender, non-distended  : Ortiz catheter draining clear yellow urine. BACK: No CVA TTP bilaterally.    NEUROLOGIC: cranial nerves 2-12 grossly intact         Data Review   Recent Results (from the past 24 hour(s))   CBC W/O DIFF    Collection Time: 08/04/19  7:05 AM   Result Value Ref Range    WBC 5.1 4.3 - 11.1 K/uL    RBC 3.27 (L) 4.05 - 5.2 M/uL    HGB 8.7 (L) 11.7 - 15.4 g/dL    HCT 26.5 (L) 35.8 - 46.3 %    MCV 81.0 79.6 - 97.8 FL    MCH 26.6 26.1 - 32.9 PG    MCHC 32.8 31.4 - 35.0 g/dL    RDW 14.4 11.9 - 14.6 %    PLATELET 370 494 - 480 K/uL    MPV 10.9 9.4 - 12.3 FL    ABSOLUTE NRBC 0.00 0.0 - 0.2 K/uL   METABOLIC PANEL, BASIC    Collection Time: 08/04/19  7:05 AM   Result Value Ref Range    Sodium 130 (L) 136 - 145 mmol/L    Potassium 3.8 3.5 - 5.1 mmol/L    Chloride 93 (L) 98 - 107 mmol/L    CO2 27 21 - 32 mmol/L    Anion gap 10 7 - 16 mmol/L    Glucose 106 (H) 65 - 100 mg/dL    BUN 35 (H) 6 - 23 MG/DL    Creatinine 5.82 (H) 0.6 - 1.0 MG/DL    GFR est AA 10 (L) >60 ml/min/1.73m2    GFR est non-AA 8 (L) >60 ml/min/1.73m2    Calcium 8.2 (L) 8.3 - 10.4 MG/DL   GLUCOSE, POC    Collection Time: 08/04/19  8:11 AM   Result Value Ref Range    Glucose (POC) 110 (H) 65 - 100 mg/dL   GLUCOSE, POC    Collection Time: 08/04/19 12:39 PM   Result Value Ref Range    Glucose (POC) 185 (H) 65 - 100 mg/dL   GLUCOSE, POC    Collection Time: 08/04/19  3:55 PM   Result Value Ref Range    Glucose (POC) 494 (HH) 65 - 100 mg/dL   GLUCOSE, POC    Collection Time: 08/04/19  4:42 PM   Result Value Ref Range    Glucose (POC) 485 (HH) 65 - 100 mg/dL   GLUCOSE, POC    Collection Time: 08/04/19  6:19 PM   Result Value Ref Range    Glucose (POC) 483 (HH) 65 - 100 mg/dL   GLUCOSE, POC    Collection Time: 08/04/19  8:36 PM   Result Value Ref Range    Glucose (POC) 365 (H) 65 - 100 mg/dL   GLUCOSE, POC    Collection Time: 08/04/19 11:04 PM   Result Value Ref Range    Glucose (POC) 225 (H) 65 - 100 mg/dL   GLUCOSE, POC    Collection Time: 08/05/19  1:21 AM   Result Value Ref Range    Glucose (POC) 235 (H) 65 - 100 mg/dL     CT A/P 8/3/19   IMPRESSION: 9 mm calculus in the left renal pelvis. Minimal perinephric  stranding without hydronephrosis.         Assessment:     Principal Problem:    Acute renal failure (ARF) ([address] (8/3/2019)    Active Problems:    HTN (hypertension) (10/3/2013)      MARILY (obstructive sleep apnea) (1/7/2016)      Mixed hyperlipidemia (5/9/2017)      Obesity, morbid ([address] (5/1/2018)      Type 2 diabetes with nephropathy ([facility] (5/1/2018)      Renal stone (8/3/2019)      POD 1 s/p Cystoscopy, LEFT Ureteroscopy, Laser Lithotripsy, LEFT Ureteral Stent Placement for L 9 mm renal pelvis stone. Doing well. Plan:     -F/U AM Cr. Remove maynard if down-trending  -Chloraseptic spray for throat this AM  -Recommend levsin 0.125 mg q4h prn bladder spasms from stent  -Will sign off.   My office will call patient with follow up in 1-2 weeks for stent removal.     Jose Moore Paul Tobin M.D.     [facility]  [place]  [facility] [address], [facility] [address]  Phone: [phone]  Fax: [phone]

## 2019-08-05 NOTE — OP NOTES
[address] [facility]  OPERATIVE REPORT    Name:  Zohra John  MR#:  [id number]  :  1975  ACCOUNT #:  [de-identified]  DATE OF SERVICE:  2019    PREOPERATIVE DIAGNOSIS:  Left ureteropelvic junction stone. POSTOPERATIVE DIAGNOSIS:  Left ureteropelvic junction stone. PROCEDURE PERFORMED:  Cystoscopy, left ureteroscopy with laser lithotripsy, basket extraction of stone, retrograde pyelogram, stent placement, and Ortiz catheter placement. SURGEON:  Adriana Mcgowan MD    ASSISTANT:  None. ANESTHESIA:  General.    COMPLICATIONS:  None. SPECIMENS REMOVED:  Left kidney stone, sent for analysis. IMPLANTS:  Placement of a 6 x 24 JJ left ureteral stent with small remnant of string remaining. ESTIMATED BLOOD LOSS:  1 mL. FINDINGS:  A 9-mm left UPJ stone fragmented and removed. INDICATIONS FOR OPERATIVE PROCEDURE:  The patient is a 27-year-old female with a history of chronic kidney disease who presented in acute renal failure with a creatinine of 9 and a left UPJ stone. Urology was consulted for management recommendations. She was taken to the operating room today for surgery to treat the stone. DESCRIPTION OF PROCEDURE:  After informed consent was obtained and the correct patient was identified in the preoperative holding area, she was taken back to the operating room suite and placed on table in the supine position. Time-out was performed confirming the correct patient and planned procedure. She received 2 g of IV Ancef prior to smooth induction of general endotracheal anesthesia. She was moved into the dorsal lithotomy position and prepped and draped in the usual sterile fashion. I began the case by inserting a 22-Dominican rigid cystoscope with a 30-degree lens in the urethra and advanced into the patient's bladder. .  Pancystoscopy was performed, which revealed a grade 2 cystocele. The ureteral orifices were in the orthotopic positions bilaterally.   I cannulated the left ureteral orifice with a sensor wire and passed this under fluoroscopic guidance into the left renal pelvis. A second sensor wire was then passed alongside the first under fluoroscopic guidance into the left renal pelvis. I back loaded the scope off the wires leaving one wire attached to the drape as a safety. The other wire was used as a working wire. I then passed an 11/13 ureteral access catheter into the proximal left ureter. I removed the inner sheath as well as the working wire leaving the access sheath in place in the proximal ureter. I then switched to my flexible disposable ureteroscope and pressure bag and inserted this into the access sheath. I observed a 9-mm left UPJ stone that likely was bivalving into the UPJ and causing intermittent obstruction and contributing some to her elevated creatinine. I then used a 200-micron laser fiber with the settings of 1 joule and 15 Hz. I lasered the stone into very small pieces. The larger pieces were removed using a Tricep basket. The remainder of the pieces were so small and they were easily passable with a ureteral stent. At this point, I then injected 10 mL of Conray through the flexible ureteroscope to perform a retrograde pyelogram and obtained a map of the kidney. I then inspected each and every venus using the retrograde pyelogram as my map. I did not note any other significant stone fragments. I then backed my scope down the ureter removing the scope and the ureteral access sheath, leaving the safety wire in place. The ureter was completely clear of stone. At this point, I then switched off the pressure bag to the rigid cystoscope. I loaded the wire onto the rigid cystoscope and passed a 6 x 24 left JJ stent with a small remnant of the string over the wire under fluoroscopic guidance into the left renal pelvis. Once the stent was in position, I pulled the wire noting good curl in the left renal pelvis as well as the patient's bladder. I then left the Ortiz catheter given her elevated creatinine for maximal drainage to try to give her kidneys the best chance possible to recover. She was then awoken from anesthesia and transferred to the PACU in stable condition. She tolerated the procedure well. There were no complications. All counts were correct at the end of the procedure. Please note, the retrograde pyelogram was normal without any significant filling defects or hydroureteronephrosis.   There was no extravasation of contrast.      Heath Sandifer, MD      [id number]  D:  08/04/2019 11:20  T:  08/04/2019 11:23  JOB #:  [id number]

## 2019-08-05 NOTE — PROGRESS NOTES
Nutrition  Reason for assessment: BPA - Malnutrition Screening Tool positive for unintended weight loss. Recently Lost Weight Without Trying: Yes  If Yes, How Much Weight Loss: 24 - 33 lbs  Eating Poorly Due to Decreased Appetite: Yes  Assessment:     Food/Nutrition Patient History: Admitted with PANCHO, renal stone POD 2 stent/lithrotripsy, hypokalemia, hyponatremia. PMH remarkable for DM, GERD, HTN, MO, depression. Pt reports declining appetite and intake slowly for 2-3 months. She reports recently eating ~50% of her usual intake. She reports # for 4-5 years reporting wt loss to 218#. Pt reports her appetite has improved post op and she is now consuming the majority of meals plus nepro. DIET NUTRITIONAL SUPPLEMENTS Breakfast, Lunch; Nepro  DIET RENAL Regular      Anthropometrics:Height: 5' 5\" (165.1 cm),  Weight: 106.1 kg (233 lb 12.8 oz), Weight Source: Bed, Body mass index is 38.91 kg/m². BMI class of obesity. Weights per EMR review (cannot be validated as stated, estimated vs measured)  WT / BMI 8/5/2019 7/16/2019 5/20/2019 5/7/2019 2/20/2019   WEIGHT 233 lb 12.8 oz 216 lb 229 lb 227 lb 249 lb     WT / BMI 11/6/2018 8/20/2018   WEIGHT 246 lb 244 lb   Based on current bed weight and pt reported UBW pt present with 6% wt loss over 3 months which does not meet the criteria for clinical significance. Macronutrient needs: 106 kg listed weight  EER:  9383-8234 kcal /day (15-20 kcal/kg)  EPR:  85 grams protein/day (0.8 grams/kg)    Intake/Comparative Standards: Recorded meal(s): 100% times one meal. Pt reports consuming 100% times past 3 meals. This pattern if sustained will potentially meets ~100% of kcal and ~85% of protein needs. Nutrition Diagnosis: Unintended weight loss related to poor appetite as evidenced by self report of barrier pta with 6% wt loss noted.       Intervention:  Meals and snacks: Change to consistent cho diet as pt with hypokalemia, good uop, improving Cr - renal diet no longer indicated. Nutrition supplement therapy: Discontinue Nepro, add glucerna once daily. Coordination of nutrition care: Presley Gomez RN. Discharge Nutrition Plan: Too soon to determine.      [place], [place], [address]

## 2019-08-05 NOTE — PROGRESS NOTES
[facility] PROGRESS NOTE    Follow up for: Jason     Subjective:   Patient seen and examined. Chart, notes, labs, imaging, results all reviewed.      ROS:  Gen - no fever, no chills, appetite okay  CV - no chest pain, no orthopnea  Lung - no shortness of breath, no cough  Abd - no tenderness, no nausea, no vomiting  Ext - no edema    Objective:   Exam:  Vitals:    08/05/19 0424 08/05/19 0516 08/05/19 0719 08/05/19 1116   BP: 128/71  137/87 124/86   Pulse: 97  95 95   Resp: 16  16 16   Temp: 97.6 °F (36.4 °C)  98.3 °F (36.8 °C) 98 °F (36.7 °C)   SpO2: 95%  94% 96%   Weight:  106.1 kg (233 lb 12.8 oz)     Height:             Intake/Output Summary (Last 24 hours) at 8/5/2019 1230  Last data filed at 8/5/2019 1225  Gross per 24 hour   Intake 480 ml   Output 7800 ml   Net -7320 ml       Current Facility-Administered Medications   Medication Dose Route Frequency    phenol throat spray (CHLORASEPTIC) 1 Spray  1 Spray Oral PRN    0.9% sodium chloride infusion  100 mL/hr IntraVENous CONTINUOUS    fluticasone propionate (FLONASE) 50 mcg/actuation nasal spray 2 Spray  2 Spray Both Nostrils DAILY    atorvastatin (LIPITOR) tablet 40 mg  40 mg Oral QHS    buPROPion SR (WELLBUTRIN SR) tablet 150 mg  150 mg Oral BID    DULoxetine (CYMBALTA) capsule 60 mg  60 mg Oral DAILY    LORazepam (ATIVAN) tablet 0.5 mg  0.5 mg Oral Q6H PRN    pantoprazole (PROTONIX) tablet 40 mg  40 mg Oral ACB    sodium chloride (NS) flush 5-40 mL  5-40 mL IntraVENous Q8H    sodium chloride (NS) flush 5-40 mL  5-40 mL IntraVENous PRN    acetaminophen (TYLENOL) tablet 650 mg  650 mg Oral Q4H PRN    HYDROcodone-acetaminophen (NORCO) 5-325 mg per tablet 1 Tab  1 Tab Oral Q4H PRN    ondansetron (ZOFRAN ODT) tablet 4 mg  4 mg Oral Q4H PRN    bisacodyl (DULCOLAX) tablet 5 mg  5 mg Oral DAILY PRN    insulin lispro (HUMALOG) injection   SubCUTAneous AC&HS    dextrose (D50W) injection syrg 25 g  50 mL IntraVENous PRN       EXAM  GEN - Alert, oriented, in no distress  CV - S1, S2, RRR, no rub, murmur, or gallop  Lung - clear to auscultation bilaterally  Abd - soft, nontender, BS present  Ext - no edema    Recent Labs     08/04/19  0705 08/03/19  0715   WBC 5.1 9.0   HGB 8.7* 10.2*   HCT 26.5* 31.9*    352        Recent Labs     08/05/19  0622 08/04/19  0705 08/03/19  1725 08/03/19  1655 08/03/19  0715   * 130*  --   --  126*   K 3.4* 3.8  --  4.1 5.9*   CL 95* 93*  --   --  92*   CO2 27 27  --   --  19*   BUN 33* 35*  --   --  45*   CREA 3.80* 5.82*  --   --  9.20*   CA 8.1* 8.2*  --   --  8.9   * 106*  --   --  121*   PHOS  --   --  5.8*  --   --        Assessment and Plan:     1. Jason   S/p cystoscopy ,ureteroscopy ,laser lithotripsy ,Left Ureteral stent placement for ( mm renal pelvis stone   Significant improvement in renal function following the above procedure with excellent UO on IVF     2. HYpokalemia   Placed on Po kCl     3.  Hyponatremia - improving       Pablo Ambrose MD

## 2019-08-05 NOTE — PROGRESS NOTES
ATTENTION:  MEDICAL STUDENT NOTE  NOT FOR CLINICAL USE  SEE ATTENDING NOTES      Medical Student Progress Note        SUBJECTIVE:   Sunday Rigo Ochoa is a 39 y.o. female with a history of type 2 DM and anxiety who was admitted on 8/3/2019 for ARF. She had a cystoscopy with lithotripsy for a 9mm left renal calculus on 8/3/19 with Dr. Sebastian Martinez. 08/05/19: She reports trouble sleeping last night due to sore throat with some nasal drainage. She states this is new since her procedure on 8/3. She also reports that fluticasone nasal spray given yesterday \"made it worse\", with increased drainage and congestion. She was able to tolerate PO intake this AM and denies any N/V/D. She reports no BM since her procedure, but she is not having abdominal pain or cramping. She also complains of an acute episode of left upper chest wall pain that lasted a few seconds early this morning. She points around the left third intercostal when describing where the pain was located. She states the pain was sharp, did not radiate, and has since resolved completely. She denies any change of the pain with position or respiration. She also reports feelings of depression since receiving the diagnosis of stage 3 CKD. She states her current medicines are helping with her symptoms and that she is able to tolerate them. She is also trying to get a referral from her PCP to see a psychiatrist.       ROS: Positive for nasal congestion and sore throat. Negative for cough, SOB, fever, chills, leg pain or swelling, abdominal pain, and N/V/D. OBJECTIVE     Visit Vitals  /87 (BP 1 Location: Right arm, BP Patient Position: At rest)   Pulse 95   Temp 98.3 °F (36.8 °C)   Resp 16   Ht 5' 5\" (1.651 m)   Wt 106.1 kg (233 lb 12.8 oz)   SpO2 94%   BMI 38.91 kg/m²        General: WDWN female in NAD. HEENT: NCAT, MMM  Lungs:  CTAB  Heart:  RRR. No murmurs, rubs, or gallops. Abdomen: Non-distended and non-tender in all four quadrants.  No palpable masses. Mild left CVA tenderness. Maynard catheter in place and draining yellow-orange urine. Extremities: Compression stockings present on both lower extremities. No edema. Radial and DP pulses 2+ bilaterally. Skin  No rashes noted. Neurologic:  Answers questions appropriately. Awake and alert. Pertinent labs, radiology:  Na 132  K 3.4  BUN 33  Creatinine 3.8 (basline 1.5)      ASSESSMENT/PLAN     Sunday John Mahoney is a 39 y.o. female with a history of type 2 DM and anxiety who was admitted on 8/3/2019 for acute renal failure. Acute renal failure: Creatinine is improving but still elevated from her baseline. Continue IV fluids and maynard catheter. Nasal congestion and sore throat: Favor as side effect of intubation from her cystoscopy rather than infectious process. She was unable to tolerate the fluticasone nasal spray, but is using phenol throat spray for symptomatic relief. Type 2 DM: She reports that taking the combination Metformin/Glyburide causes her to have diarrhea. She wants to discuss taking these medications separately to see if that will help with the diarrhea. Will continue insulin lispro, metformin, and glyburide. She has had some BG readings in the 400s since her admission, but initially reported to the ER with BG 30, so will continue to monitor her BG closely. Nursing has given her orange juice to drink if she starts to feel shaky or weak. Anxiety: continue Bupropion and Lorazepam. She wishes to follow up with psych as an outpatient when discharged. Signed By: Joseph Herrera     August 5, 2019        ATTENTION:  MEDICAL STUDENT NOTE  NOT FOR CLINICAL USE  SEE ATTENDING NOTES  *ATTENTION:  This note has been created by a medical student for educational purposes only. Please do not refer to the content of this note for clinical decision-making, billing, or other purposes. Please see attending physicians note to obtain clinical information on this patient. *

## 2019-08-05 NOTE — PROGRESS NOTES
Hourly rounds performed. Complaints of pain in her side. Ortiz draining pink urine. Will continue to monitor and report to oncoming nurse.

## 2019-08-06 VITALS
TEMPERATURE: 98.4 F | BODY MASS INDEX: 36.85 KG/M2 | RESPIRATION RATE: 16 BRPM | WEIGHT: 221.2 LBS | OXYGEN SATURATION: 95 % | HEART RATE: 94 BPM | DIASTOLIC BLOOD PRESSURE: 91 MMHG | SYSTOLIC BLOOD PRESSURE: 132 MMHG | HEIGHT: 65 IN

## 2019-08-06 LAB
ANION GAP SERPL CALC-SCNC: 6 MMOL/L (ref 7–16)
BUN SERPL-MCNC: 18 MG/DL (ref 6–23)
CALCIUM SERPL-MCNC: 8.5 MG/DL (ref 8.3–10.4)
CHLORIDE SERPL-SCNC: 102 MMOL/L (ref 98–107)
CO2 SERPL-SCNC: 30 MMOL/L (ref 21–32)
CREAT SERPL-MCNC: 2.12 MG/DL (ref 0.6–1)
GLUCOSE BLD STRIP.AUTO-MCNC: 145 MG/DL (ref 65–100)
GLUCOSE BLD STRIP.AUTO-MCNC: 174 MG/DL (ref 65–100)
GLUCOSE SERPL-MCNC: 202 MG/DL (ref 65–100)
POTASSIUM SERPL-SCNC: 3.6 MMOL/L (ref 3.5–5.1)
SODIUM SERPL-SCNC: 138 MMOL/L (ref 136–145)

## 2019-08-06 PROCEDURE — 80048 BASIC METABOLIC PNL TOTAL CA: CPT

## 2019-08-06 PROCEDURE — 74011636637 HC RX REV CODE- 636/637: Performed by: FAMILY MEDICINE

## 2019-08-06 PROCEDURE — 36415 COLL VENOUS BLD VENIPUNCTURE: CPT

## 2019-08-06 PROCEDURE — 74011250637 HC RX REV CODE- 250/637: Performed by: FAMILY MEDICINE

## 2019-08-06 PROCEDURE — 77030020263 HC SOL INJ SOD CL0.9% LFCR 1000ML

## 2019-08-06 PROCEDURE — 74011250636 HC RX REV CODE- 250/636: Performed by: INTERNAL MEDICINE

## 2019-08-06 PROCEDURE — 82962 GLUCOSE BLOOD TEST: CPT

## 2019-08-06 RX ORDER — HYDROCODONE BITARTRATE AND ACETAMINOPHEN 5; 325 MG/1; MG/1
1 TABLET ORAL
Qty: 10 TAB | Refills: 0 | Status: SHIPPED | OUTPATIENT
Start: 2019-08-06 | End: 2019-08-09

## 2019-08-06 RX ORDER — POLYETHYLENE GLYCOL 3350 17 G/17G
17 POWDER, FOR SOLUTION ORAL DAILY
Qty: 235 G | Refills: 0 | Status: SHIPPED | OUTPATIENT
Start: 2019-08-06 | End: 2020-08-25

## 2019-08-06 RX ORDER — BISACODYL 5 MG
5 TABLET, DELAYED RELEASE (ENTERIC COATED) ORAL
Qty: 30 TAB | Refills: 0 | Status: SHIPPED | OUTPATIENT
Start: 2019-08-06 | End: 2019-11-11

## 2019-08-06 RX ORDER — GLYBURIDE 5 MG/1
5 TABLET ORAL 2 TIMES DAILY WITH MEALS
Qty: 60 TAB | Refills: 0 | Status: SHIPPED | OUTPATIENT
Start: 2019-08-06 | End: 2019-08-13 | Stop reason: SDUPTHER

## 2019-08-06 RX ADMIN — BUPROPION HYDROCHLORIDE 150 MG: 150 TABLET, EXTENDED RELEASE ORAL at 08:50

## 2019-08-06 RX ADMIN — DULOXETINE HYDROCHLORIDE 60 MG: 60 CAPSULE, DELAYED RELEASE ORAL at 08:50

## 2019-08-06 RX ADMIN — INSULIN LISPRO 2 UNITS: 100 INJECTION, SOLUTION INTRAVENOUS; SUBCUTANEOUS at 11:53

## 2019-08-06 RX ADMIN — BISACODYL 5 MG: 5 TABLET, COATED ORAL at 08:56

## 2019-08-06 RX ADMIN — SODIUM CHLORIDE 100 ML/HR: 900 INJECTION, SOLUTION INTRAVENOUS at 08:55

## 2019-08-06 RX ADMIN — PANTOPRAZOLE SODIUM 40 MG: 40 TABLET, DELAYED RELEASE ORAL at 05:41

## 2019-08-06 RX ADMIN — Medication 10 ML: at 05:41

## 2019-08-06 RX ADMIN — HYDROCODONE BITARTRATE AND ACETAMINOPHEN 1 TABLET: 5; 325 TABLET ORAL at 03:36

## 2019-08-06 RX ADMIN — HYDROCODONE BITARTRATE AND ACETAMINOPHEN 1 TABLET: 5; 325 TABLET ORAL at 08:50

## 2019-08-06 NOTE — PROGRESS NOTES
Discharge instructions and prescriptions provided and explained to patient, patient voiced understanding. Medication side effect sheet reviewed with pt. No home meds or valuables to return. Opportunity for questions provided. Pt to void prior to discharge.

## 2019-08-06 NOTE — PROGRESS NOTES
[facility] PROGRESS NOTE    Follow up for: Jason     Subjective:   Patient seen and examined. Chart, notes, labs, imaging, results all reviewed.      ROS:  Gen - no fever, no chills, appetite okay  CV - no chest pain, no orthopnea  Lung - no shortness of breath, no cough  Abd - no tenderness, no nausea, no vomiting  Ext - no edema    Objective:   Exam:  Vitals:    08/06/19 0040 08/06/19 0340 08/06/19 0718 08/06/19 1115   BP:  147/80 124/89 (!) 132/91   Pulse:  88 83 94   Resp:  18 18 16   Temp:  98.1 °F (36.7 °C) 97.7 °F (36.5 °C) 98.4 °F (36.9 °C)   SpO2: 95% 98% 96% 95%   Weight:    100.3 kg (221 lb 3.2 oz)   Height:             Intake/Output Summary (Last 24 hours) at 8/6/2019 1344  Last data filed at 8/6/2019 1059  Gross per 24 hour   Intake 240 ml   Output 4700 ml   Net -4460 ml       Current Facility-Administered Medications   Medication Dose Route Frequency    phenol throat spray (CHLORASEPTIC) 1 Spray  1 Spray Oral PRN    0.9% sodium chloride infusion  100 mL/hr IntraVENous CONTINUOUS    fluticasone propionate (FLONASE) 50 mcg/actuation nasal spray 2 Spray  2 Spray Both Nostrils DAILY    atorvastatin (LIPITOR) tablet 40 mg  40 mg Oral QHS    buPROPion SR (WELLBUTRIN SR) tablet 150 mg  150 mg Oral BID    DULoxetine (CYMBALTA) capsule 60 mg  60 mg Oral DAILY    LORazepam (ATIVAN) tablet 0.5 mg  0.5 mg Oral Q6H PRN    pantoprazole (PROTONIX) tablet 40 mg  40 mg Oral ACB    sodium chloride (NS) flush 5-40 mL  5-40 mL IntraVENous Q8H    sodium chloride (NS) flush 5-40 mL  5-40 mL IntraVENous PRN    acetaminophen (TYLENOL) tablet 650 mg  650 mg Oral Q4H PRN    HYDROcodone-acetaminophen (NORCO) 5-325 mg per tablet 1 Tab  1 Tab Oral Q4H PRN    ondansetron (ZOFRAN ODT) tablet 4 mg  4 mg Oral Q4H PRN    bisacodyl (DULCOLAX) tablet 5 mg  5 mg Oral DAILY PRN    insulin lispro (HUMALOG) injection   SubCUTAneous AC&HS    dextrose (D50W) injection syrg 25 g  50 mL IntraVENous PRN       EXAM  GEN - Alert, oriented, in no distress  CV - S1, S2, RRR, no rub, murmur, or gallop  Lung - clear to auscultation bilaterally  Abd - soft, nontender, BS present  Ext - no edema    Recent Labs     08/04/19  0705   WBC 5.1   HGB 8.7*   HCT 26.5*           Recent Labs     08/06/19  1133 08/05/19  0622 08/04/19  0705 08/03/19  1725    132* 130*  --    K 3.6 3.4* 3.8  --     95* 93*  --    CO2 30 27 27  --    BUN 18 33* 35*  --    CREA 2.12* 3.80* 5.82*  --    CA 8.5 8.1* 8.2*  --    * 213* 106*  --    PHOS  --   --   --  5.8*       Assessment and Plan:     1. Jason over ckd stage3   S/p cystoscopy ,ureteroscopy ,laser lithotripsy ,Left Ureteral stent placement for ( mm renal pelvis stone   Significant improvement in renal function following the above procedure with excellent UO on IVF     2. HYpokalemia   Placed on Po kCl     3. Hyponatremia - improving     Okay to be discharged from nephrology standpoint .  [facility]       Betito Triplett MD

## 2019-08-06 NOTE — PROGRESS NOTES
Pt to DC home today with no needs voiced. Care Management Interventions  PCP Verified by CM:  Yes  Transition of Care Consult (CM Consult): Discharge Planning  Current Support Network: Own Home  Confirm Follow Up Transport: Family  Plan discussed with Pt/Family/Caregiver: Yes  Freedom of Choice Offered: Yes  Discharge Location  Discharge Placement: Home

## 2019-08-06 NOTE — PROGRESS NOTES
Patient declined wearing CPAP this evening. Knows she may change her mind at any time. SpO2 is 95% on room air. Will continue to monitor.        08/06/19 0040   Oxygen Therapy   O2 Sat (%) 95 %   Pulse via Oximetry 82 beats per minute   O2 Device Room air

## 2019-08-06 NOTE — PROGRESS NOTES
ATTENTION:  MEDICAL STUDENT NOTE  NOT FOR CLINICAL USE  SEE ATTENDING NOTES      Medical Student Progress Note        SUBJECTIVE:   Sunday Fabiola Marroquin is a 39 y.o. female with a history of type 2 DM and CKD who was admitted on 8/3/2019 for acute renal failure. CT scan revealed a left 9mm stone, for which she had a lithotripsy on 8/4.    08/06/19: Ms. Errol Farrell reports she frequent unproductive cough throughout the night last night that awoke her from sleep on several occasions. She also complains of chest congestion and sore throat. She is using phenol throat spray with improvement. She also reports one additional episode of brief sharp left-sided chest wall pain yesterday evening. This episode lasted a few seconds, did not radiate, and has since resolved completely. She has continued constipation today, and complains of abdominal bloating and \"feeling full\". Her last BM was Friday (8/2) and was diarrhea, which she states is normal for her since starting Metformin. Her  reports she has attempted to have a BM several times, but has not passed stool. She had a good appetite yesterday, but reports decreased appetite this morning. She also reports passing \"sediment\" in her urine last night. She still has a maynard catheter in place. ROS: Negative for N/V, fever,chills, SOB. Positive for congestion, cough, chest wall pain, constipation, decreased appetite. OBJECTIVE     Visit Vitals  /89 (BP 1 Location: Right arm, BP Patient Position: At rest)   Pulse 83   Temp 97.7 °F (36.5 °C)   Resp 18   Ht 5' 5\" (1.651 m)   Wt 106.1 kg (233 lb 12.8 oz)   SpO2 96%   BMI 38.91 kg/m²        General: WDWN female in NAD. HEENT: MMM, NCAT  Lungs:  CTAB  Heart:  RRR. No murmurs, rubs, or gallops. Abdomen: Abd soft. Mild left-sided CVA tenderness. Extremities: Radial pulses 2+ bilaterally. Moves in bed without difficulty. Skin  No rashes noted. Neurologic:  Awake and alert.  Answers questions appropriately. Pertinent labs, radiology:  Cr 3.8, down from 9.2 on 8/3. Baseline is 1.5. BUN 33  Na 132  K 3.4  Cl 95      ASSESSMENT/PLAN     Sunday Shelbi Upton is a 39 y.o. female with a history of type 2 DM and CKD who was admitted on 8/3/2019 for acute renal failure. CT scan revealed a 9mm left-sided stone, for which she had lithotripsy on 8/4. Acute renal failure: Cr is improving since lithotripsy - was 3.8 yesterday, down from 9.2 on 8/3. Her baseline Cr is 1.5. Will recheck CMP this morning. Continue IV fluids. Type 2 DM: BG has been steady with no episodes of hypoglycemia. Continue insulin lispro. Hold Metformin and glyburide. Constipation: She has taken duclolax without improvement. May be side effect of hydrocodone. If patient's pain level is tolerable, consider switching to Tylenol for pain. NSAIDs CI given current kidney injury. Congestion and sore throat: Likely side effect of intubation from procedure. Continue phenol throat spray, which she reports is helping. She also reports two brief episodes of acute chest wall pain - favor chest wall muscle spasm given frequent coughing. Signed By: Peng Arguelles     August 6, 2019        ATTENTION:  MEDICAL STUDENT NOTE  NOT FOR CLINICAL USE  SEE ATTENDING NOTES    *ATTENTION:  This note has been created by a medical student for educational purposes only. Please do not refer to the content of this note for clinical decision-making, billing, or other purposes. Please see attending physicians note to obtain clinical information on this patient. *

## 2019-08-06 NOTE — DISCHARGE INSTRUCTIONS
Patient Education     DISCHARGE SUMMARY from Nurse    PATIENT INSTRUCTIONS:    After general anesthesia or intravenous sedation, for 24 hours or while taking prescription Narcotics:  · Limit your activities  · Do not drive and operate hazardous machinery  · Do not make important personal or business decisions  · Do  not drink alcoholic beverages  · If you have not urinated within 8 hours after discharge, please contact your surgeon on call. Report the following to your surgeon:  · Excessive pain, swelling, redness or odor of or around the surgical area  · Temperature over 100.5  · Nausea and vomiting lasting longer than 4 hours or if unable to take medications  · Any signs of decreased circulation or nerve impairment to extremity: change in color, persistent  numbness, tingling, coldness or increase pain  · Any questions    What to do at Home:  Recommended activity: Activity as tolerated,     If you experience any of the following symptoms fever, chills, new or unrelieved pain, persistent nausea or vomiting, difficulty urinating or any other worrisome symptoms , please follow up with PCP/URO. *  Please give a list of your current medications to your Primary Care Provider. *  Please update this list whenever your medications are discontinued, doses are      changed, or new medications (including over-the-counter products) are added. *  Please carry medication information at all times in case of emergency situations. These are general instructions for a healthy lifestyle:    No smoking/ No tobacco products/ Avoid exposure to second hand smoke  Surgeon General's Warning:  Quitting smoking now greatly reduces serious risk to your health.     Obesity, smoking, and sedentary lifestyle greatly increases your risk for illness    A healthy diet, regular physical exercise & weight monitoring are important for maintaining a healthy lifestyle    You may be retaining fluid if you have a history of heart failure or if you experience any of the following symptoms:  Weight gain of 3 pounds or more overnight or 5 pounds in a week, increased swelling in our hands or feet or shortness of breath while lying flat in bed. Please call your doctor as soon as you notice any of these symptoms; do not wait until your next office visit. The discharge information has been reviewed with the patient. The patient verbalized understanding. Discharge medications reviewed with the patient and appropriate educational materials and side effects teaching were provided. ___________________________________________________________________________________________________________________________________     Bienvenido Gaucher: Care Instructions  Your Care Instructions    Kidney stones are formed when salts, minerals, and other substances normally found in the urine clump together. They can be as small as grains of sand or, rarely, as large as golf balls. While the stone is traveling through the ureter, which is the tube that carries urine from the kidney to the bladder, you will probably feel pain. The pain may be mild or very severe. You may also have some blood in your urine. As soon as the stone reaches the bladder, any intense pain should go away. If a stone is too large to pass on its own, you may need a medical procedure to help you pass the stone. The doctor has checked you carefully, but problems can develop later. If you notice any problems or new symptoms, get medical treatment right away. Follow-up care is a key part of your treatment and safety. Be sure to make and go to all appointments, and call your doctor if you are having problems. It's also a good idea to know your test results and keep a list of the medicines you take. How can you care for yourself at home? · Drink plenty of fluids, enough so that your urine is light yellow or clear like water.  If you have kidney, heart, or liver disease and have to limit fluids, talk with your doctor before you increase the amount of fluids you drink. · Take pain medicines exactly as directed. Call your doctor if you think you are having a problem with your medicine. ? If the doctor gave you a prescription medicine for pain, take it as prescribed. ? If you are not taking a prescription pain medicine, ask your doctor if you can take an over-the-counter medicine. Read and follow all instructions on the label. · Your doctor may ask you to strain your urine so that you can collect your kidney stone when it passes. You can use a kitchen strainer or a tea strainer to catch the stone. Store it in a plastic bag until you see your doctor again. Preventing future kidney stones  Some changes in your diet may help prevent kidney stones. Depending on the cause of your stones, your doctor may recommend that you:  · Drink plenty of fluids, enough so that your urine is light yellow or clear like water. If you have kidney, heart, or liver disease and have to limit fluids, talk with your doctor before you increase the amount of fluids you drink. · Limit coffee, tea, and alcohol. Also avoid grapefruit juice. · Do not take more than the recommended daily dose of vitamins C and D.  · Avoid antacids such as Gaviscon, Maalox, Mylanta, or Tums. · Limit the amount of salt (sodium) in your diet. · Eat a balanced diet that is not too high in protein. · Limit foods that are high in a substance called oxalate, which can cause kidney stones. These foods include dark green vegetables, rhubarb, chocolate, wheat bran, nuts, cranberries, and beans. When should you call for help?   Call your doctor now or seek immediate medical care if:    · You cannot keep down fluids.     · Your pain gets worse.     · You have a fever or chills.     · You have new or worse pain in your back just below your rib cage (the flank area).     · You have new or more blood in your urine.    Watch closely for changes in your health, and be sure to contact your doctor if:    · You do not get better as expected. Where can you learn more? Go to http://marky-sonal.info/. Enter B163 in the search box to learn more about \"Kidney Stone: Care Instructions. \"  Current as of: October 31, 2018  Content Version: 12.1  © 5144-8041 [employer]. Care instructions adapted under license by [employer] (which disclaims liability or warranty for this information). If you have questions about a medical condition or this instruction, always ask your healthcare professional. [address] any warranty or liability for your use of this information.

## 2019-10-09 ENCOUNTER — HOSPITAL ENCOUNTER (OUTPATIENT)
Dept: ULTRASOUND IMAGING | Age: 44
Discharge: HOME | End: 2019-10-09
Attending: UROLOGY
Payer: COMMERCIAL

## 2019-10-09 DIAGNOSIS — N13.30 HYDRONEPHROSIS, UNSPECIFIED HYDRONEPHROSIS TYPE: ICD-10-CM

## 2019-10-09 PROCEDURE — 76770 US EXAM ABDO BACK WALL COMP: CPT

## 2019-11-11 PROBLEM — N18.30 CKD (CHRONIC KIDNEY DISEASE) STAGE 3, GFR 30-59 ML/MIN (HCC): Status: ACTIVE | Noted: 2019-11-11

## 2020-12-21 ENCOUNTER — HOSPITAL ENCOUNTER (OUTPATIENT)
Dept: MAMMOGRAPHY | Age: 45
Discharge: HOME | End: 2020-12-21
Attending: FAMILY MEDICINE
Payer: COMMERCIAL

## 2020-12-21 DIAGNOSIS — Z12.31 ENCOUNTER FOR SCREENING MAMMOGRAM FOR BREAST CANCER: ICD-10-CM

## 2020-12-21 PROCEDURE — 77067 SCR MAMMO BI INCL CAD: CPT

## 2021-05-06 ENCOUNTER — HOSPITAL ENCOUNTER (EMERGENCY)
Age: 46
Discharge: HOME | End: 2021-05-06
Attending: STUDENT IN AN ORGANIZED HEALTH CARE EDUCATION/TRAINING PROGRAM
Payer: COMMERCIAL

## 2021-05-06 ENCOUNTER — APPOINTMENT (OUTPATIENT)
Dept: CT IMAGING | Age: 46
End: 2021-05-06
Attending: STUDENT IN AN ORGANIZED HEALTH CARE EDUCATION/TRAINING PROGRAM
Payer: COMMERCIAL

## 2021-05-06 VITALS
OXYGEN SATURATION: 99 % | BODY MASS INDEX: 40.82 KG/M2 | RESPIRATION RATE: 16 BRPM | HEART RATE: 97 BPM | SYSTOLIC BLOOD PRESSURE: 134 MMHG | TEMPERATURE: 98.9 F | DIASTOLIC BLOOD PRESSURE: 93 MMHG | WEIGHT: 245 LBS | HEIGHT: 65 IN

## 2021-05-06 DIAGNOSIS — R10.9 FLANK PAIN: Primary | ICD-10-CM

## 2021-05-06 LAB
ALBUMIN SERPL-MCNC: 3.5 G/DL (ref 3.5–5)
ALBUMIN/GLOB SERPL: 0.9 {RATIO} (ref 1.2–3.5)
ALP SERPL-CCNC: 88 U/L (ref 50–136)
ALT SERPL-CCNC: 87 U/L (ref 12–65)
ANION GAP SERPL CALC-SCNC: 8 MMOL/L (ref 7–16)
AST SERPL-CCNC: 46 U/L (ref 15–37)
BACTERIA URNS QL MICRO: NORMAL /HPF
BASOPHILS # BLD: 0.1 K/UL (ref 0–0.2)
BASOPHILS NFR BLD: 1 % (ref 0–2)
BILIRUB SERPL-MCNC: 0.3 MG/DL (ref 0.2–1.1)
BUN SERPL-MCNC: 30 MG/DL (ref 6–23)
CALCIUM SERPL-MCNC: 9.1 MG/DL (ref 8.3–10.4)
CASTS URNS QL MICRO: 0 /LPF
CHLORIDE SERPL-SCNC: 99 MMOL/L (ref 98–107)
CO2 SERPL-SCNC: 27 MMOL/L (ref 21–32)
CREAT SERPL-MCNC: 1.32 MG/DL (ref 0.6–1)
CRYSTALS URNS QL MICRO: 0 /LPF
DIFFERENTIAL METHOD BLD: NORMAL
EOSINOPHIL # BLD: 0.4 K/UL (ref 0–0.8)
EOSINOPHIL NFR BLD: 5 % (ref 0.5–7.8)
EPI CELLS #/AREA URNS HPF: NORMAL /HPF
ERYTHROCYTE [DISTWIDTH] IN BLOOD BY AUTOMATED COUNT: 12.3 % (ref 11.9–14.6)
GLOBULIN SER CALC-MCNC: 3.9 G/DL (ref 2.3–3.5)
GLUCOSE SERPL-MCNC: 227 MG/DL (ref 65–100)
HCG UR QL: NEGATIVE
HCT VFR BLD AUTO: 38.7 % (ref 35.8–46.3)
HGB BLD-MCNC: 13.4 G/DL (ref 11.7–15.4)
IMM GRANULOCYTES # BLD AUTO: 0 K/UL (ref 0–0.5)
IMM GRANULOCYTES NFR BLD AUTO: 1 % (ref 0–5)
LIPASE SERPL-CCNC: 156 U/L (ref 73–393)
LYMPHOCYTES # BLD: 2.7 K/UL (ref 0.5–4.6)
LYMPHOCYTES NFR BLD: 33 % (ref 13–44)
MCH RBC QN AUTO: 32.1 PG (ref 26.1–32.9)
MCHC RBC AUTO-ENTMCNC: 34.6 G/DL (ref 31.4–35)
MCV RBC AUTO: 92.8 FL (ref 79.6–97.8)
MONOCYTES # BLD: 0.5 K/UL (ref 0.1–1.3)
MONOCYTES NFR BLD: 6 % (ref 4–12)
MUCOUS THREADS URNS QL MICRO: 0 /LPF
NEUTS SEG # BLD: 4.4 K/UL (ref 1.7–8.2)
NEUTS SEG NFR BLD: 55 % (ref 43–78)
NRBC # BLD: 0 K/UL (ref 0–0.2)
OTHER OBSERVATIONS,UCOM: NORMAL
PLATELET # BLD AUTO: 278 K/UL (ref 150–450)
PMV BLD AUTO: 10.4 FL (ref 9.4–12.3)
POTASSIUM SERPL-SCNC: 3.6 MMOL/L (ref 3.5–5.1)
PROT SERPL-MCNC: 7.4 G/DL (ref 6.3–8.2)
RBC # BLD AUTO: 4.17 M/UL (ref 4.05–5.2)
RBC #/AREA URNS HPF: NORMAL /HPF
SODIUM SERPL-SCNC: 134 MMOL/L (ref 136–145)
WBC # BLD AUTO: 8 K/UL (ref 4.3–11.1)
WBC URNS QL MICRO: NORMAL /HPF

## 2021-05-06 PROCEDURE — 81025 URINE PREGNANCY TEST: CPT

## 2021-05-06 PROCEDURE — 81003 URINALYSIS AUTO W/O SCOPE: CPT

## 2021-05-06 PROCEDURE — 99284 EMERGENCY DEPT VISIT MOD MDM: CPT

## 2021-05-06 PROCEDURE — 83690 ASSAY OF LIPASE: CPT

## 2021-05-06 PROCEDURE — 74176 CT ABD & PELVIS W/O CONTRAST: CPT

## 2021-05-06 PROCEDURE — 85025 COMPLETE CBC W/AUTO DIFF WBC: CPT

## 2021-05-06 PROCEDURE — 96374 THER/PROPH/DIAG INJ IV PUSH: CPT

## 2021-05-06 PROCEDURE — 74011250636 HC RX REV CODE- 250/636: Performed by: STUDENT IN AN ORGANIZED HEALTH CARE EDUCATION/TRAINING PROGRAM

## 2021-05-06 PROCEDURE — 96361 HYDRATE IV INFUSION ADD-ON: CPT

## 2021-05-06 PROCEDURE — 81015 MICROSCOPIC EXAM OF URINE: CPT

## 2021-05-06 PROCEDURE — 80053 COMPREHEN METABOLIC PANEL: CPT

## 2021-05-06 RX ORDER — METHOCARBAMOL 750 MG/1
750 TABLET, FILM COATED ORAL 4 TIMES DAILY
Qty: 12 TAB | Refills: 0 | Status: SHIPPED | OUTPATIENT
Start: 2021-05-06 | End: 2021-05-09

## 2021-05-06 RX ORDER — ONDANSETRON 2 MG/ML
4 INJECTION INTRAMUSCULAR; INTRAVENOUS
Status: COMPLETED | OUTPATIENT
Start: 2021-05-06 | End: 2021-05-06

## 2021-05-06 RX ADMIN — ONDANSETRON 4 MG: 2 INJECTION INTRAMUSCULAR; INTRAVENOUS at 18:35

## 2021-05-06 RX ADMIN — SODIUM CHLORIDE 1000 ML: 900 INJECTION, SOLUTION INTRAVENOUS at 19:11

## 2021-05-06 NOTE — ED TRIAGE NOTES
Ambulatory to triage without difficulty. C/o left flank and lower back pain for the last 2 days. Denies hematuria. Denies n/v/d. Has hx of kidney stones.

## 2021-05-06 NOTE — ED PROVIDER NOTES
74-year-old female history of kidney stones, presents to the ER complaining of left-sided flank pain. States it began 3 days ago. Denies dysuria or hematuria. Endorses nausea without vomiting. Does have history of kidney stones in the past.  Previously she states she had no pain with prior kidney stone. Denies trauma to her back. Denies cough or congestion. Denies fever, chills, chest pain, shortness of breath, abdominal pain, diarrhea, melena. Past Medical History:   Diagnosis Date    PANCHO (acute kidney injury) ([facility]     saw nephrologist 2/26/14. [place] due to volume depletion, meds. Meds adjusted, now f/u with PCP    Diabetes ([facility] 2011    [place], avgfbs 115, last HA1C 6.6, does not experience hypo    Gall bladder disease     GERD (gastroesophageal reflux disease)     controlled with protonix    Hypercholesterolemia     Hypertension     well controlled by medication    Morbid obesity ([facility]     BMI 41.7    Psychiatric disorder     depression , controlled by cymbalta    Sleep apnea        Past Surgical History:   Procedure Laterality Date    HX CHOLECYSTECTOMY      HX NEPHROSTOMY  08/01/2019    Dr. Loera Pass    HX TONSILLECTOMY           Family History:   Adopted: Yes   Family history unknown: Yes       Social History     Socioeconomic History    Marital status:      Spouse name: Not on file    Number of children: Not on file    Years of education: Not on file    Highest education level: Not on file   Occupational History    Not on file   Social Needs    Financial resource strain: Not on file    Food insecurity     Worry: Not on file     Inability: Not on file    Transportation needs     Medical: Not on file     Non-medical: Not on file   Tobacco Use    Smoking status: Never Smoker    Smokeless tobacco: Never Used   Substance and Sexual Activity    Alcohol use:  Yes     Alcohol/week: 0.0 standard drinks     Comment: occasional 1 gls per month    Drug use: No    Sexual activity: Not Currently     Birth control/protection: Pill   Lifestyle    Physical activity     Days per week: Not on file     Minutes per session: Not on file    Stress: Not on file   Relationships    Social connections     Talks on phone: Not on file     Gets together: Not on file     Attends [employer] service: Not on file     Active member of club or organization: Not on file     Attends meetings of clubs or organizations: Not on file     Relationship status: Not on file    Intimate partner violence     Fear of current or ex partner: Not on file     Emotionally abused: Not on file     Physically abused: Not on file     Forced sexual activity: Not on file   Other Topics Concern    Not on file   Social History Narrative    Not on file         ALLERGIES: Amoxil [amoxicillin] and Percocet [oxycodone-acetaminophen]    Review of Systems   Constitutional: Negative for chills and fever. HENT: Negative for sinus pressure and sore throat. Eyes: Negative for visual disturbance. Respiratory: Negative for cough and shortness of breath. Cardiovascular: Negative for chest pain. Gastrointestinal: Positive for nausea. Negative for abdominal pain, diarrhea and vomiting. Endocrine: Negative for polyuria. Genitourinary: Positive for flank pain. Negative for difficulty urinating and dysuria. Musculoskeletal: Negative for neck pain and neck stiffness. Skin: Negative for rash. Neurological: Negative for weakness and headaches. Psychiatric/Behavioral: Negative for confusion. Vitals:    05/06/21 1732   BP: (!) 134/93   Pulse: 97   Resp: 16   Temp: 98.9 °F (37.2 °C)   SpO2: 99%   Weight: 111.1 kg (245 lb)   Height: 5' 5\" (1.651 m)            Physical Exam  Vitals signs and nursing note reviewed. Constitutional:       Appearance: Normal appearance. She is not ill-appearing or toxic-appearing. HENT:      Head: Normocephalic and atraumatic.       Nose: Nose normal.      Mouth/Throat:      Mouth: Mucous membranes are moist.   Eyes:      Extraocular Movements: Extraocular movements intact. Neck:      Musculoskeletal: Normal range of motion. No neck rigidity. Cardiovascular:      Rate and Rhythm: Normal rate and regular rhythm. Pulses: Normal pulses. Heart sounds: Normal heart sounds. Pulmonary:      Effort: Pulmonary effort is normal. No respiratory distress. Breath sounds: Normal breath sounds. Abdominal:      General: Abdomen is flat. There is no distension. Palpations: Abdomen is soft. Tenderness: There is no abdominal tenderness. There is left CVA tenderness. Musculoskeletal: Normal range of motion. Skin:     General: Skin is warm and dry. Neurological:      General: No focal deficit present. Mental Status: She is alert and oriented to person, place, and time. Psychiatric:         Mood and Affect: Mood normal.          MDM  Number of Diagnoses or Management Options  Flank pain  Diagnosis management comments: 62-year-old female history of kidney stones presents to the ER with left flank pain. Also endorses nausea without vomiting. Patient with history of kidney stones but previously without pain. Urinalysis obtained shows 5-10 white cells with 3-5 epithelial, no RBCs present. hCG negative, white count 8.0, stable H&H, grossly normal electrolytes, BUN 30, creatinine 1.32 patient with slightly worse from baseline kidney function. Patient given IV fluids here in the ER, lipase normal, CT scan without contrast was obtained of her abdomen and pelvis, did not reveal the source of her flank pain. No evidence of stone. I did advise that she potentially could have passed a kidney stone already. Patient was given Robaxin for spasms in case etiology is musculoskeletal.  She will continue alternating Tylenol and Motrin for symptoms. She will follow up with a family physician in 2 to 3 days. Patient will return to the ER for worsening or worrisome symptoms.   Patient and family voiced understanding agreement with this plan.        Amount and/or Complexity of Data Reviewed  Clinical lab tests: ordered and reviewed  Tests in the radiology section of CPT®: ordered and reviewed    Risk of Complications, Morbidity, and/or Mortality  Presenting problems: moderate  Diagnostic procedures: moderate  Management options: moderate           Procedures

## 2021-05-06 NOTE — DISCHARGE INSTRUCTIONS
Use Robaxin as needed for muscle spasms. Alternate Tylenol Motrin as needed for pain. Follow-up with family physician as needed. Return to the ER for worsening or worrisome symptoms. Do not drive or operate heavy machinery while taking these sedating medications.

## 2021-05-07 NOTE — ED NOTES
I have reviewed discharge instructions with the patient. The patient verbalized understanding. Patient left ED via Discharge Method: ambulatory to Home with     Opportunity for questions and clarification provided. Patient given 1 scripts. To continue your aftercare when you leave the hospital, you may receive an automated call from our care team to check in on how you are doing. This is a free service and part of our promise to provide the best care and service to meet your aftercare needs.  If you have questions, or wish to unsubscribe from this service please call [phone]. Thank you for Choosing our [address] [place] [address] Emergency Department.

## 2021-10-13 PROBLEM — E11.22 TYPE 2 DIABETES MELLITUS WITH CHRONIC KIDNEY DISEASE (HCC): Status: ACTIVE | Noted: 2018-05-01

## 2022-03-02 ENCOUNTER — HOSPITAL ENCOUNTER (OUTPATIENT)
Dept: MAMMOGRAPHY | Age: 47
Discharge: HOME | End: 2022-03-02
Attending: OBSTETRICS & GYNECOLOGY
Payer: COMMERCIAL

## 2022-03-02 ENCOUNTER — TRANSCRIBE ORDER (OUTPATIENT)
Dept: SCHEDULING | Age: 47
End: 2022-03-02

## 2022-03-02 DIAGNOSIS — Z12.31 SCREENING MAMMOGRAM FOR HIGH-RISK PATIENT: Primary | ICD-10-CM

## 2022-03-02 DIAGNOSIS — Z12.31 SCREENING MAMMOGRAM FOR HIGH-RISK PATIENT: ICD-10-CM

## 2022-03-02 PROCEDURE — 77063 BREAST TOMOSYNTHESIS BI: CPT

## 2022-03-18 PROBLEM — E11.9 CONTROLLED TYPE 2 DIABETES MELLITUS WITHOUT COMPLICATION, WITHOUT LONG-TERM CURRENT USE OF INSULIN (HCC): Status: ACTIVE | Noted: 2017-05-09

## 2022-03-18 PROBLEM — N18.30 CKD (CHRONIC KIDNEY DISEASE) STAGE 3, GFR 30-59 ML/MIN (HCC): Status: ACTIVE | Noted: 2019-11-11

## 2022-03-18 PROBLEM — E78.2 MIXED HYPERLIPIDEMIA: Status: ACTIVE | Noted: 2017-05-09

## 2022-03-18 PROBLEM — E11.22 TYPE 2 DIABETES MELLITUS WITH CHRONIC KIDNEY DISEASE (HCC): Status: ACTIVE | Noted: 2018-05-01

## 2022-03-19 PROBLEM — N20.0 RENAL STONE: Status: ACTIVE | Noted: 2019-08-03

## 2022-03-19 PROBLEM — I10 ESSENTIAL HYPERTENSION: Status: ACTIVE | Noted: 2017-05-09

## 2022-03-19 PROBLEM — E66.01 OBESITY, MORBID (HCC): Status: ACTIVE | Noted: 2018-05-01

## 2022-03-19 PROBLEM — R22.9 SUBCUTANEOUS MASS: Status: ACTIVE | Noted: 2017-06-19

## 2022-03-19 PROBLEM — N17.9 ACUTE RENAL FAILURE (ARF) (HCC): Status: ACTIVE | Noted: 2019-08-03

## 2022-06-10 ENCOUNTER — OFFICE VISIT (OUTPATIENT)
Dept: INTERNAL MEDICINE CLINIC | Facility: CLINIC | Age: 47
End: 2022-06-10
Payer: COMMERCIAL

## 2022-06-10 VITALS
TEMPERATURE: 97.3 F | WEIGHT: 231 LBS | DIASTOLIC BLOOD PRESSURE: 85 MMHG | HEIGHT: 65 IN | BODY MASS INDEX: 38.49 KG/M2 | HEART RATE: 105 BPM | OXYGEN SATURATION: 98 % | SYSTOLIC BLOOD PRESSURE: 123 MMHG | RESPIRATION RATE: 16 BRPM

## 2022-06-10 DIAGNOSIS — G47.33 OSA (OBSTRUCTIVE SLEEP APNEA): ICD-10-CM

## 2022-06-10 DIAGNOSIS — E66.01 CLASS 2 SEVERE OBESITY DUE TO EXCESS CALORIES WITH SERIOUS COMORBIDITY AND BODY MASS INDEX (BMI) OF 38.0 TO 38.9 IN ADULT (HCC): ICD-10-CM

## 2022-06-10 DIAGNOSIS — E11.9 CONTROLLED TYPE 2 DIABETES MELLITUS WITHOUT COMPLICATION, WITHOUT LONG-TERM CURRENT USE OF INSULIN (HCC): Primary | ICD-10-CM

## 2022-06-10 DIAGNOSIS — F33.1 MODERATE EPISODE OF RECURRENT MAJOR DEPRESSIVE DISORDER (HCC): ICD-10-CM

## 2022-06-10 DIAGNOSIS — E78.2 MIXED HYPERLIPIDEMIA: ICD-10-CM

## 2022-06-10 DIAGNOSIS — I10 ESSENTIAL HYPERTENSION: ICD-10-CM

## 2022-06-10 DIAGNOSIS — M79.10 MYALGIA: ICD-10-CM

## 2022-06-10 LAB
ANION GAP SERPL CALC-SCNC: 9 MMOL/L (ref 7–16)
BASOPHILS # BLD: 0.1 K/UL (ref 0–0.2)
BASOPHILS NFR BLD: 1 % (ref 0–2)
BUN SERPL-MCNC: 30 MG/DL (ref 6–23)
CALCIUM SERPL-MCNC: 10.4 MG/DL (ref 8.3–10.4)
CHLORIDE SERPL-SCNC: 101 MMOL/L (ref 98–107)
CHOLEST SERPL-MCNC: 187 MG/DL
CK SERPL-CCNC: 41 U/L (ref 21–215)
CO2 SERPL-SCNC: 27 MMOL/L (ref 21–32)
CREAT SERPL-MCNC: 1.7 MG/DL (ref 0.6–1)
DIFFERENTIAL METHOD BLD: ABNORMAL
EOSINOPHIL # BLD: 0.3 K/UL (ref 0–0.8)
EOSINOPHIL NFR BLD: 3 % (ref 0.5–7.8)
ERYTHROCYTE [DISTWIDTH] IN BLOOD BY AUTOMATED COUNT: 12 % (ref 11.9–14.6)
EST. AVERAGE GLUCOSE BLD GHB EST-MCNC: 160 MG/DL
GLUCOSE SERPL-MCNC: 197 MG/DL (ref 65–100)
HBA1C MFR BLD: 7.2 % (ref 4.2–6.3)
HCT VFR BLD AUTO: 48.5 % (ref 35.8–46.3)
HDLC SERPL-MCNC: 38 MG/DL (ref 40–60)
HDLC SERPL: 4.9 {RATIO}
HGB BLD-MCNC: 16.6 G/DL (ref 11.7–15.4)
IMM GRANULOCYTES # BLD AUTO: 0 K/UL (ref 0–0.5)
IMM GRANULOCYTES NFR BLD AUTO: 0 % (ref 0–5)
LDLC SERPL CALC-MCNC: 99.4 MG/DL
LYMPHOCYTES # BLD: 2.5 K/UL (ref 0.5–4.6)
LYMPHOCYTES NFR BLD: 27 % (ref 13–44)
MCH RBC QN AUTO: 32.1 PG (ref 26.1–32.9)
MCHC RBC AUTO-ENTMCNC: 34.2 G/DL (ref 31.4–35)
MCV RBC AUTO: 93.8 FL (ref 79.6–97.8)
MONOCYTES # BLD: 0.7 K/UL (ref 0.1–1.3)
MONOCYTES NFR BLD: 7 % (ref 4–12)
NEUTS SEG # BLD: 5.7 K/UL (ref 1.7–8.2)
NEUTS SEG NFR BLD: 62 % (ref 43–78)
NRBC # BLD: 0 K/UL (ref 0–0.2)
PLATELET # BLD AUTO: 342 K/UL (ref 150–450)
PMV BLD AUTO: 11.3 FL (ref 9.4–12.3)
POTASSIUM SERPL-SCNC: 3.9 MMOL/L (ref 3.5–5.1)
RBC # BLD AUTO: 5.17 M/UL (ref 4.05–5.2)
SODIUM SERPL-SCNC: 137 MMOL/L (ref 136–145)
TRIGL SERPL-MCNC: 248 MG/DL (ref 35–150)
TSH W FREE THYROID IF ABNORMAL: 0.9 UIU/ML (ref 0.36–3.74)
VLDLC SERPL CALC-MCNC: 49.6 MG/DL (ref 6–23)
WBC # BLD AUTO: 9.3 K/UL (ref 4.3–11.1)

## 2022-06-10 PROCEDURE — G8427 DOCREV CUR MEDS BY ELIG CLIN: HCPCS | Performed by: INTERNAL MEDICINE

## 2022-06-10 PROCEDURE — 3051F HG A1C>EQUAL 7.0%<8.0%: CPT | Performed by: INTERNAL MEDICINE

## 2022-06-10 PROCEDURE — 99204 OFFICE O/P NEW MOD 45 MIN: CPT | Performed by: INTERNAL MEDICINE

## 2022-06-10 PROCEDURE — 1036F TOBACCO NON-USER: CPT | Performed by: INTERNAL MEDICINE

## 2022-06-10 PROCEDURE — 2022F DILAT RTA XM EVC RTNOPTHY: CPT | Performed by: INTERNAL MEDICINE

## 2022-06-10 PROCEDURE — G8417 CALC BMI ABV UP PARAM F/U: HCPCS | Performed by: INTERNAL MEDICINE

## 2022-06-10 PROCEDURE — 95800 SLP STDY UNATTENDED: CPT | Performed by: INTERNAL MEDICINE

## 2022-06-10 RX ORDER — TRIAMCINOLONE ACETONIDE 1 MG/G
CREAM TOPICAL
COMMUNITY
Start: 2022-05-16

## 2022-06-10 RX ORDER — IVERMECTIN 10 MG/G
CREAM TOPICAL
COMMUNITY
Start: 2022-05-17

## 2022-06-10 ASSESSMENT — PATIENT HEALTH QUESTIONNAIRE - PHQ9
2. FEELING DOWN, DEPRESSED OR HOPELESS: 2
3. TROUBLE FALLING OR STAYING ASLEEP: 3
9. THOUGHTS THAT YOU WOULD BE BETTER OFF DEAD, OR OF HURTING YOURSELF: 0
SUM OF ALL RESPONSES TO PHQ QUESTIONS 1-9: 16
SUM OF ALL RESPONSES TO PHQ QUESTIONS 1-9: 16
6. FEELING BAD ABOUT YOURSELF - OR THAT YOU ARE A FAILURE OR HAVE LET YOURSELF OR YOUR FAMILY DOWN: 2
7. TROUBLE CONCENTRATING ON THINGS, SUCH AS READING THE NEWSPAPER OR WATCHING TELEVISION: 3
4. FEELING TIRED OR HAVING LITTLE ENERGY: 3
5. POOR APPETITE OR OVEREATING: 2
SUM OF ALL RESPONSES TO PHQ QUESTIONS 1-9: 16
SUM OF ALL RESPONSES TO PHQ9 QUESTIONS 1 & 2: 3
8. MOVING OR SPEAKING SO SLOWLY THAT OTHER PEOPLE COULD HAVE NOTICED. OR THE OPPOSITE, BEING SO FIGETY OR RESTLESS THAT YOU HAVE BEEN MOVING AROUND A LOT MORE THAN USUAL: 0
SUM OF ALL RESPONSES TO PHQ QUESTIONS 1-9: 16
1. LITTLE INTEREST OR PLEASURE IN DOING THINGS: 1
10. IF YOU CHECKED OFF ANY PROBLEMS, HOW DIFFICULT HAVE THESE PROBLEMS MADE IT FOR YOU TO DO YOUR WORK, TAKE CARE OF THINGS AT HOME, OR GET ALONG WITH OTHER PEOPLE: 1

## 2022-06-10 NOTE — PROGRESS NOTES
06/10/2022   Location:[facility] [address] [facility]  [place]  Patient #:  [id number]  YOB: 1975        History of Present Illness     Chief Complaint   Patient presents with   Maye Rowland Establish Care     NP appointment    Diabetes     go over diabetes plan    Neck Pain     x 1 month    Knee Pain     right x 1 month       Ms. Dottie Lucas is a 51 y.o. female  who presents for visit to establish care. Chronic active medical issues DM, HTN, CKD, HLD, anxiety and depression neuropathy. Reports BS not optimally controlled but does not want to be on any additional medications. She is currently 25 mg daily, Amaryl 4 mg daily Ozempic 1 mg weekly she reports her morning readings from from 150 to her postprandial readings are in the 200s. She has only been checking her blood sugars twice a day every other day. She is interested in getting a Dexcom so that she may monitor her readings more often. She does admit to noncompliance with recommended diet and exercise recommendations. Reports issues with toenails thickened onychomycosis. Complains of neck pain chest dry 1 hour to work each way every day. She reports sometimes getting sleepy while driving and almost falling asleep. She has known KENRICK but has not been using it. She reports inability to tolerate mask. Sleep study and CPAP rxd years ago. Complains some pain in her right calf hurts when she tries no other kind of the bother her. 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 She seen dermatology for hypergranulation and is on proper medication cream.  She sees mental health provided for anxiety and depression. Has panic attacks. Sh edenies being suicidal but there are times she just shuts down and does not want to do anything. She is under the care of nephrologist for chronic kidney disease.      Last Labs      Allergies   Allergen Reactions    Amoxicillin Other (See Comments)     Itching     Oxycodone-Acetaminophen Itching Past Medical History:   Diagnosis Date    MIRNA (acute kidney injury) ([facility] [address]     saw nephrologist 2/26/14. [place] due to volume depletion, meds. Meds adjusted, now f/u with PCP    Diabetes ([facility] 2011    april, clairebs 115, last HA1C 6.6, does not experience hypo    Gall bladder disease     GERD (gastroesophageal reflux disease)     controlled with protonix    Hypercholesterolemia     Hypertension     well controlled by medication    Morbid obesity ([facility]     BMI 41.7    Psychiatric disorder     depression , controlled by cymbalta    Sleep apnea      Social History     Socioeconomic History    Marital status:      Spouse name: None    Number of children: None    Years of education: None    Highest education level: None   Occupational History    None   Tobacco Use    Smoking status: Never Smoker    Smokeless tobacco: Never Used   Substance and Sexual Activity    Alcohol use: Yes     Alcohol/week: 0.0 standard drinks    Drug use: No    Sexual activity: None   Other Topics Concern    None   Social History Narrative    None     Social Determinants of Health     Financial Resource Strain:     Difficulty of Paying Living Expenses: Not on file   Food Insecurity:     Worried About Running Out of Food in the Last Year: Not on file    Flora of Food in the Last Year: Not on file   Transportation Needs:     Lack of Transportation (Medical): Not on file    Lack of Transportation (Non-Medical):  Not on file   Physical Activity:     Days of Exercise per Week: Not on file    Minutes of Exercise per Session: Not on file   Stress:     Feeling of Stress : Not on file   Social Connections:     Frequency of Communication with Friends and Family: Not on file    Frequency of Social Gatherings with Friends and Family: Not on file    Attends Yazdanism Services: Not on file    Active Member of Clubs or Organizations: Not on file    Attends Club or Organization Meetings: Not on file    Marital Status: Not on file   Intimate Partner Violence:     Fear of Current or Ex-Partner: Not on file    Emotionally Abused: Not on file    Physically Abused: Not on file    Sexually Abused: Not on file   Housing Stability:     Unable to Pay for Housing in the Last Year: Not on file    Number of Places Lived in the Last Year: Not on file    Unstable Housing in the Last Year: Not on file     Past Surgical History:   Procedure Laterality Date    CHOLECYSTECTOMY      NEPHROSTOMY  08/01/2019    Dr. Thais Mosley       Family History   Adopted: Yes   Problem Relation Age of Onset    Breast Cancer Neg Hx      Current Outpatient Medications   Medication Sig Dispense Refill    Ivermectin 1 % CREA APPLY TOPICALLY TO CLEAN DRY FACE TWICE DAILY FOR 2 WEEKS THEN AS NEEDED FLARES      triamcinolone (KENALOG) 0.1 % cream APPLY TO AFFECTED RASH ON ARM TWICE DAILY FOR 14 DAYS THEN STOP.  DO NOT USE ON THE FACE      APPLE CIDER VINEGAR PO Take by mouth      ascorbic acid (VITAMIN C) 250 MG tablet Take by mouth      aspirin 81 MG EC tablet Take by mouth daily      atorvastatin (LIPITOR) 40 MG tablet TAKE 1 TABLET BY MOUTH EVERY DAY      Biotin 2.5 MG CAPS Take by mouth      vitamin D (CHOLECALCIFEROL) 25 MCG (1000 UT) TABS tablet Take by mouth daily      DULoxetine (CYMBALTA) 30 MG extended release capsule 1 po qd with the 60 mg capsules      DULoxetine (CYMBALTA) 60 MG extended release capsule 2 po qd for mood Indications: major depressive disorder      empagliflozin (JARDIANCE) 25 MG tablet TAKE 1 TABLET BY MOUTH EVERY MORNING FOR DIABETES      ferrous gluconate (FERGON) 240 (27 Fe) MG tablet Take 240 mg by mouth 3 times daily (with meals)      fluticasone (FLONASE) 50 MCG/ACT nasal spray 2 sprays by Nasal route daily      glimepiride (AMARYL) 4 MG tablet TAKE 1 TABLET BY MOUTH TWICE DAILY WITH MEALS      levocetirizine (XYZAL) 5 MG tablet Take 5 mg by mouth daily      LORazepam (ATIVAN) 1 MG tablet Take 1/2 to one tablet twice a day as needed Indications: anxious      losartan-hydroCHLOROthiazide (HYZAAR) 100-12.5 MG per tablet TAKE 1 TABLET BY MOUTH EVERY DAY      norethindrone (MICRONOR) 0.35 MG tablet Take 0.35 mg by mouth daily      omeprazole (PRILOSEC) 10 MG delayed release capsule Take 10 mg by mouth daily      ondansetron (ZOFRAN-ODT) 4 MG disintegrating tablet Take 4 mg by mouth every 8 hours as needed      Semaglutide, 1 MG/DOSE, 2 MG/1.5ML SOPN Inject 1 mg into the skin every 7 days      SUMAtriptan (IMITREX) 100 MG tablet TAKE 1/2 TO 1 TABLET BY MOUTH AT ONSET OF MIGRAINE MAY REPEAT IN 2 HOURS IF NEEDED       No current facility-administered medications for this visit. Health Maintenance   Topic Date Due    Diabetic foot exam  Never done    HIV screen  Never done    Diabetic retinal exam  Never done    Hepatitis C screen  Never done    Hepatitis B vaccine (1 of 3 - Risk 3-dose series) Never done    Cervical cancer screen  Never done    Pneumococcal 0-64 years Vaccine (2 - PCV) 11/28/2018    Colorectal Cancer Screen  Never done    Flu vaccine (Season Ended) 09/01/2022    A1C test (Diabetic or Prediabetic)  03/25/2023    Lipids  03/25/2023    Depression Monitoring  06/10/2023    DTaP/Tdap/Td vaccine (2 - Td or Tdap) 07/14/2025    COVID-19 Vaccine  Completed    Hepatitis A vaccine  Aged Out    Hib vaccine  Aged Out    Meningococcal (ACWY) vaccine  Aged Out             Review of Systems  Review of Systems   Constitutional: Positive for fatigue. Negative for fever. Respiratory: Positive for apnea. Negative for shortness of breath. Cardiovascular: Negative for chest pain. Gastrointestinal: Negative for abdominal pain. Genitourinary: Negative for difficulty urinating. Musculoskeletal: Positive for arthralgias and myalgias. Neurological: Positive for headaches. Hematological: Negative for adenopathy.    Psychiatric/Behavioral: Positive for dysphoric mood and sleep Psychiatric:         Mood and Affect: Mood normal.         Behavior: Behavior normal.         Thought Content: Thought content normal.             Assessment & Plan    Encounter Diagnoses   Name Primary?  Controlled type 2 diabetes mellitus without complication, without long-term current use of insulin ([facility]) Yes    Moderate episode of recurrent major depressive disorder ([facility])     Class 2 severe obesity due to excess calories with serious comorbidity and body mass index (BMI) of 38.0 to 38.9 in adult ([facility])     Myalgia     Essential hypertension     KENRICK (obstructive sleep apnea)     Mixed hyperlipidemia        No orders of the defined types were placed in this encounter. Orders Placed This Encounter   Procedures    SLEEP STUDY, UNATTENDED, RECORD HEART RATE/O2 SAT     Scheduling Instructions:      David       Please evaluate with with home study. History of KENRICK. Need re evaluation. Intolerant of current CPAP.  CBC with Auto Differential     Standing Status:   Future     Number of Occurrences:   1     Standing Expiration Date:   6/10/2023    Basic Metabolic Panel     Standing Status:   Future     Number of Occurrences:   1     Standing Expiration Date:   6/10/2023    Hemoglobin A1C     Standing Status:   Future     Number of Occurrences:   1     Standing Expiration Date:   6/10/2023    Lipid Panel     Standing Status:   Future     Number of Occurrences:   1     Standing Expiration Date:   6/10/2023     Order Specific Question:   Is Patient Fasting?/# of Hours     Answer:   fasting    CK     Standing Status:   Future     Number of Occurrences:   1     Standing Expiration Date:   6/10/2023    TSH with Reflex     Standing Status:   Future     Number of Occurrences:   1     Standing Expiration Date:   6/10/2023       Repeat sleep study and see if she will tolerate autopap or newer CPAP. Consider stimulant.    Order Dexcom  The patient is asked to make an attempt to improve diet and exercise patterns to aid in medical management of this problem. Discussed the importance of regular exercise and a healthy diet. Discussed complications of poorly controlled diabetes including blindness, kidney failure, amputation due to poor circulation and nerve damage, MI and even death. No follow-ups on file.         Tiffany Molina MD

## 2022-06-10 NOTE — TELEPHONE ENCOUNTER
----- Message from [facility] sent at 6/10/2022 10:41 AM EDT -----  Subject: Message to Provider    QUESTIONS  Information for Provider? Pt. just in to see RENETTA BOSS and has one   other medication that will be needed by the end of the month Omzetic. Please call patient to clarify last visit? RENETTA BOSS MD GVL   [facility] 6/10/2022 8:45 AM  ---------------------------------------------------------------------------  --------------  Theo Muse INFO  What is the best way for the office to contact you? OK to leave message on   voicemail  Preferred Call Back Phone Number? [phone]  ---------------------------------------------------------------------------  --------------  SCRIPT ANSWERS  Relationship to Patient?  Self

## 2022-06-13 ENCOUNTER — TELEPHONE (OUTPATIENT)
Dept: INTERNAL MEDICINE CLINIC | Facility: CLINIC | Age: 47
End: 2022-06-13

## 2022-06-13 NOTE — TELEPHONE ENCOUNTER
----- Message from Michael Calderon MD sent at 6/13/2022  8:15 AM EDT -----  Please call and notify patient:  Reviewed recent labs. Diabetes control was okay. A1c was 7.2. As we discussed during your visit diet and exercise will get it below 7. Otherwise we should adjust your meds. Based on labs you may be dehydrated and with a decline in your kidney function. Increase your water intake. Aiming for 60 oz a day. Will repeat labs at follow up.   Michael Calderon MD

## 2022-06-14 DIAGNOSIS — E11.9 CONTROLLED TYPE 2 DIABETES MELLITUS WITHOUT COMPLICATION, WITHOUT LONG-TERM CURRENT USE OF INSULIN (HCC): Primary | ICD-10-CM

## 2022-06-14 RX ORDER — BLOOD-GLUCOSE SENSOR
EACH MISCELLANEOUS
Qty: 10 EACH | Refills: 5 | Status: SHIPPED | OUTPATIENT
Start: 2022-06-14

## 2022-06-14 RX ORDER — BLOOD-GLUCOSE,RECEIVER,CONT
EACH MISCELLANEOUS
Qty: 1 EACH | Refills: 0 | Status: SHIPPED | OUTPATIENT
Start: 2022-06-14

## 2022-06-14 RX ORDER — BLOOD-GLUCOSE TRANSMITTER
EACH MISCELLANEOUS
Qty: 10 EACH | Refills: 5 | Status: SHIPPED | OUTPATIENT
Start: 2022-06-14

## 2022-06-14 NOTE — TELEPHONE ENCOUNTER
Patient seen Dr Ave Patiño on Friday she is out ozempic and needs the dexcom monitoring system   Please call the patient

## 2022-06-15 ASSESSMENT — ENCOUNTER SYMPTOMS
SHORTNESS OF BREATH: 0
ABDOMINAL PAIN: 0
APNEA: 1

## 2022-07-07 ENCOUNTER — PATIENT MESSAGE (OUTPATIENT)
Dept: INTERNAL MEDICINE CLINIC | Facility: CLINIC | Age: 47
End: 2022-07-07

## 2022-07-08 NOTE — TELEPHONE ENCOUNTER
From: Sunday TITUS Hawkins  To: Dr. Stephenie Whittington: 7/7/2022 9:29 AM EDT  Subject: Iona Mcdowell     Good Morning, I hate to bother you again I have went to refill my Jardiance 25 mg and now the new insurance wants a prior authorization form for this medicine now.  Please send this prior authorization to [employer] please, under my 's name Balwinder Mccray

## 2022-08-19 ENCOUNTER — PATIENT MESSAGE (OUTPATIENT)
Dept: INTERNAL MEDICINE CLINIC | Facility: CLINIC | Age: 47
End: 2022-08-19

## 2022-08-22 DIAGNOSIS — R19.5 LOOSE STOOLS: ICD-10-CM

## 2022-08-22 DIAGNOSIS — Z12.11 ENCOUNTER FOR SCREENING COLONOSCOPY: Primary | ICD-10-CM

## 2022-08-22 RX ORDER — GLIMEPIRIDE 4 MG/1
TABLET ORAL
Qty: 60 TABLET | OUTPATIENT
Start: 2022-08-22

## 2022-08-22 NOTE — TELEPHONE ENCOUNTER
So you did not have colonoscopy in 2019 when you saw them? Any change in diet? Meds? Is the only time you have the loose stools after  meal? How long has this been going on? Have you been swimming in any lakes? Anybody else it the home with loose bowels? We can definitely refer [facility] back to [facility]. You are due to colonoscopy at this point.   Omar Davis MD

## 2022-08-22 NOTE — TELEPHONE ENCOUNTER
From: Sunday TITUS Hawkins  To: Dr. Sherri Kearney: 8/19/2022 10:06 AM EDT  Subject: Stomach issues    Good Morning Dr Devin Gordon,  I have been having some stomach issues I wanted you to know about. When I eat lately after finishing within 10 to 15 mins I am having to rush to the bathroom and my stool has been a mixture of liquid and solids. I know I am over 44 and Dr's suggest to have a colonoscopy by this time. Could you get me referred to [facility]. I have seen them before. When I seen them before was right before I had my gallbladder took out and I was too young to need the colonoscopy with no other issues. ..  Give me your thoughts  Thank you  [facility]

## 2022-08-30 ENCOUNTER — TELEPHONE (OUTPATIENT)
Dept: INTERNAL MEDICINE CLINIC | Facility: CLINIC | Age: 47
End: 2022-08-30

## 2022-08-30 NOTE — TELEPHONE ENCOUNTER
Randee can you type this for me  To whom it may concern. Morteza Peterson is a patient at [facility]. She is currently receiving treatment for depression. Thank you for your attention to this matter.    Sincerely,  Tereso Degroot MD

## 2022-08-30 NOTE — TELEPHONE ENCOUNTER
Patient needs a letter stating that she is prescribed a medication for depression    please call the patient when complete

## 2022-09-09 ENCOUNTER — OFFICE VISIT (OUTPATIENT)
Dept: INTERNAL MEDICINE CLINIC | Facility: CLINIC | Age: 47
End: 2022-09-09
Payer: COMMERCIAL

## 2022-09-09 VITALS
HEART RATE: 106 BPM | TEMPERATURE: 97.3 F | WEIGHT: 232.8 LBS | BODY MASS INDEX: 38.79 KG/M2 | SYSTOLIC BLOOD PRESSURE: 111 MMHG | DIASTOLIC BLOOD PRESSURE: 82 MMHG | RESPIRATION RATE: 17 BRPM | OXYGEN SATURATION: 96 % | HEIGHT: 65 IN

## 2022-09-09 DIAGNOSIS — F41.9 ANXIETY: ICD-10-CM

## 2022-09-09 DIAGNOSIS — E78.2 MIXED HYPERLIPIDEMIA: ICD-10-CM

## 2022-09-09 DIAGNOSIS — N92.6 IRREGULAR PERIODS/MENSTRUAL CYCLES: ICD-10-CM

## 2022-09-09 DIAGNOSIS — E11.9 CONTROLLED TYPE 2 DIABETES MELLITUS WITHOUT COMPLICATION, WITHOUT LONG-TERM CURRENT USE OF INSULIN (HCC): ICD-10-CM

## 2022-09-09 DIAGNOSIS — Z79.899 ENCOUNTER FOR LONG-TERM (CURRENT) USE OF MEDICATIONS: ICD-10-CM

## 2022-09-09 DIAGNOSIS — I10 ESSENTIAL HYPERTENSION: Primary | ICD-10-CM

## 2022-09-09 LAB
ANION GAP SERPL CALC-SCNC: 9 MMOL/L (ref 4–13)
BUN SERPL-MCNC: 27 MG/DL (ref 6–23)
CALCIUM SERPL-MCNC: 9.5 MG/DL (ref 8.3–10.4)
CHLORIDE SERPL-SCNC: 105 MMOL/L (ref 101–110)
CO2 SERPL-SCNC: 24 MMOL/L (ref 21–32)
CREAT SERPL-MCNC: 1.4 MG/DL (ref 0.6–1)
GLUCOSE SERPL-MCNC: 164 MG/DL (ref 65–100)
MAGNESIUM SERPL-MCNC: 1.6 MG/DL (ref 1.8–2.4)
POTASSIUM SERPL-SCNC: 3.4 MMOL/L (ref 3.5–5.1)
SODIUM SERPL-SCNC: 138 MMOL/L (ref 136–145)
TSH W FREE THYROID IF ABNORMAL: 1.31 UIU/ML (ref 0.36–3.74)

## 2022-09-09 PROCEDURE — 1036F TOBACCO NON-USER: CPT | Performed by: INTERNAL MEDICINE

## 2022-09-09 PROCEDURE — 2022F DILAT RTA XM EVC RTNOPTHY: CPT | Performed by: INTERNAL MEDICINE

## 2022-09-09 PROCEDURE — 3051F HG A1C>EQUAL 7.0%<8.0%: CPT | Performed by: INTERNAL MEDICINE

## 2022-09-09 PROCEDURE — 99214 OFFICE O/P EST MOD 30 MIN: CPT | Performed by: INTERNAL MEDICINE

## 2022-09-09 PROCEDURE — G8427 DOCREV CUR MEDS BY ELIG CLIN: HCPCS | Performed by: INTERNAL MEDICINE

## 2022-09-09 PROCEDURE — G8417 CALC BMI ABV UP PARAM F/U: HCPCS | Performed by: INTERNAL MEDICINE

## 2022-09-09 RX ORDER — LOSARTAN POTASSIUM AND HYDROCHLOROTHIAZIDE 12.5; 1 MG/1; MG/1
1 TABLET ORAL DAILY
Qty: 30 TABLET | Refills: 5 | Status: SHIPPED | OUTPATIENT
Start: 2022-09-09

## 2022-09-09 RX ORDER — OMEPRAZOLE 10 MG/1
10 CAPSULE, DELAYED RELEASE ORAL DAILY
Qty: 30 CAPSULE | Refills: 5 | Status: SHIPPED | OUTPATIENT
Start: 2022-09-09

## 2022-09-09 RX ORDER — LORAZEPAM 1 MG/1
1 TABLET ORAL 2 TIMES DAILY
Qty: 60 TABLET | Refills: 5 | Status: SHIPPED | OUTPATIENT
Start: 2022-09-09 | End: 2023-09-09

## 2022-09-09 RX ORDER — SEMAGLUTIDE 2.68 MG/ML
2 INJECTION, SOLUTION SUBCUTANEOUS
Qty: 3 ML | Refills: 5 | Status: SHIPPED | OUTPATIENT
Start: 2022-09-09

## 2022-09-09 SDOH — ECONOMIC STABILITY: FOOD INSECURITY: WITHIN THE PAST 12 MONTHS, YOU WORRIED THAT YOUR FOOD WOULD RUN OUT BEFORE YOU GOT MONEY TO BUY MORE.: NEVER TRUE

## 2022-09-09 SDOH — ECONOMIC STABILITY: FOOD INSECURITY: WITHIN THE PAST 12 MONTHS, THE FOOD YOU BOUGHT JUST DIDN'T LAST AND YOU DIDN'T HAVE MONEY TO GET MORE.: NEVER TRUE

## 2022-09-09 ASSESSMENT — PATIENT HEALTH QUESTIONNAIRE - PHQ9
7. TROUBLE CONCENTRATING ON THINGS, SUCH AS READING THE NEWSPAPER OR WATCHING TELEVISION: 2
SUM OF ALL RESPONSES TO PHQ9 QUESTIONS 1 & 2: 4
1. LITTLE INTEREST OR PLEASURE IN DOING THINGS: 2
3. TROUBLE FALLING OR STAYING ASLEEP: 1
6. FEELING BAD ABOUT YOURSELF - OR THAT YOU ARE A FAILURE OR HAVE LET YOURSELF OR YOUR FAMILY DOWN: 1
9. THOUGHTS THAT YOU WOULD BE BETTER OFF DEAD, OR OF HURTING YOURSELF: 0
4. FEELING TIRED OR HAVING LITTLE ENERGY: 1
10. IF YOU CHECKED OFF ANY PROBLEMS, HOW DIFFICULT HAVE THESE PROBLEMS MADE IT FOR YOU TO DO YOUR WORK, TAKE CARE OF THINGS AT HOME, OR GET ALONG WITH OTHER PEOPLE: 2
SUM OF ALL RESPONSES TO PHQ QUESTIONS 1-9: 10
8. MOVING OR SPEAKING SO SLOWLY THAT OTHER PEOPLE COULD HAVE NOTICED. OR THE OPPOSITE, BEING SO FIGETY OR RESTLESS THAT YOU HAVE BEEN MOVING AROUND A LOT MORE THAN USUAL: 0
5. POOR APPETITE OR OVEREATING: 1
SUM OF ALL RESPONSES TO PHQ QUESTIONS 1-9: 10
SUM OF ALL RESPONSES TO PHQ QUESTIONS 1-9: 10
2. FEELING DOWN, DEPRESSED OR HOPELESS: 2
SUM OF ALL RESPONSES TO PHQ QUESTIONS 1-9: 10

## 2022-09-09 ASSESSMENT — SOCIAL DETERMINANTS OF HEALTH (SDOH): HOW HARD IS IT FOR YOU TO PAY FOR THE VERY BASICS LIKE FOOD, HOUSING, MEDICAL CARE, AND HEATING?: NOT HARD AT ALL

## 2022-09-09 NOTE — PROGRESS NOTES
History    Marital status:      Spouse name: None    Number of children: None    Years of education: None    Highest education level: None   Tobacco Use    Smoking status: Never    Smokeless tobacco: Never   Substance and Sexual Activity    Alcohol use: Yes     Alcohol/week: 0.0 standard drinks    Drug use: No     Social Determinants of Health     Financial Resource Strain: Low Risk     Difficulty of Paying Living Expenses: Not hard at all   Food Insecurity: No Food Insecurity    Worried About Running Out of Food in the Last Year: Never true    Ran Out of Food in the Last Year: Never true     Past Surgical History:   Procedure Laterality Date    CHOLECYSTECTOMY      NEPHROSTOMY  08/01/2019    Dr. Sadie Barrios       Family History   Adopted: Yes   Problem Relation Age of Onset    Breast Cancer Neg Hx      Current Outpatient Medications   Medication Sig Dispense Refill    Continuous Blood Gluc Transmit (DEXCOM G6 TRANSMITTER) MISC Check glucose daily 10 each 5    Continuous Blood Gluc Sensor (DEXCOM G6 SENSOR) MISC Check glucose daily 10 each 5    Continuous Blood Gluc  (DEXCOM G6 ) MARGARETH Check glucose daily 1 each 0    Ivermectin 1 % CREA APPLY TOPICALLY TO CLEAN DRY FACE TWICE DAILY FOR 2 WEEKS THEN AS NEEDED FLARES      triamcinolone (KENALOG) 0.1 % cream APPLY TO AFFECTED RASH ON ARM TWICE DAILY FOR 14 DAYS THEN STOP.  DO NOT USE ON THE FACE      Semaglutide, 1 MG/DOSE, 2 MG/1.5ML SOPN Inject 1 mg into the skin every 7 days 3 pen 3    APPLE CIDER VINEGAR PO Take by mouth      ascorbic acid (VITAMIN C) 250 MG tablet Take by mouth      aspirin 81 MG EC tablet Take by mouth daily      atorvastatin (LIPITOR) 40 MG tablet TAKE 1 TABLET BY MOUTH EVERY DAY      Biotin 2.5 MG CAPS Take by mouth      vitamin D (CHOLECALCIFEROL) 25 MCG (1000 UT) TABS tablet Take by mouth daily      DULoxetine (CYMBALTA) 30 MG extended release capsule 1 po qd with the 60 mg capsules      DULoxetine (CYMBALTA) 60 MG extended release capsule 2 po qd for mood Indications: major depressive disorder      empagliflozin (JARDIANCE) 25 MG tablet TAKE 1 TABLET BY MOUTH EVERY MORNING FOR DIABETES      ferrous gluconate (FERGON) 240 (27 Fe) MG tablet Take 240 mg by mouth 3 times daily (with meals)      fluticasone (FLONASE) 50 MCG/ACT nasal spray 2 sprays by Nasal route daily      glimepiride (AMARYL) 4 MG tablet TAKE 1 TABLET BY MOUTH TWICE DAILY WITH MEALS      levocetirizine (XYZAL) 5 MG tablet Take 5 mg by mouth daily      LORazepam (ATIVAN) 1 MG tablet Take 1/2 to one tablet twice a day as needed Indications: anxious      losartan-hydroCHLOROthiazide (HYZAAR) 100-12.5 MG per tablet TAKE 1 TABLET BY MOUTH EVERY DAY      norethindrone (MICRONOR) 0.35 MG tablet Take 0.35 mg by mouth daily      omeprazole (PRILOSEC) 10 MG delayed release capsule Take 10 mg by mouth daily      ondansetron (ZOFRAN-ODT) 4 MG disintegrating tablet Take 4 mg by mouth every 8 hours as needed      SUMAtriptan (IMITREX) 100 MG tablet TAKE 1/2 TO 1 TABLET BY MOUTH AT ONSET OF MIGRAINE MAY REPEAT IN 2 HOURS IF NEEDED       No current facility-administered medications for this visit. Health Maintenance   Topic Date Due    HIV screen  Never done    Hepatitis C screen  Never done    Hepatitis B vaccine (1 of 3 - Risk 3-dose series) Never done    Pneumococcal 0-64 years Vaccine (2 - PCV) 11/28/2018    Diabetic retinal exam  05/19/2019    Colorectal Cancer Screen  Never done    Flu vaccine (1) 09/01/2022    Diabetic foot exam  06/10/2023    A1C test (Diabetic or Prediabetic)  06/10/2023    Lipids  06/10/2023    Depression Monitoring  06/10/2023    DTaP/Tdap/Td vaccine (2 - Td or Tdap) 07/14/2025    Cervical cancer screen  01/27/2026    COVID-19 Vaccine  Completed    Hepatitis A vaccine  Aged Out    Hib vaccine  Aged Out    Meningococcal (ACWY) vaccine  Aged Out             Review of Systems  Review of Systems   Constitutional:  Negative for fever. Respiratory:  Negative for shortness of breath. Cardiovascular:  Negative for chest pain. Gastrointestinal:  Negative for abdominal pain. Musculoskeletal:  Positive for arthralgias. Psychiatric/Behavioral:  Positive for dysphoric mood. The patient is nervous/anxious. /82 (Site: Left Upper Arm, Position: Sitting, Cuff Size: Large Adult)   Pulse (!) 106   Temp 97.3 °F (36.3 °C) (Skin)   Resp 17   Ht 5' 5\" (1.651 m)   Wt 232 lb 12.8 oz (105.6 kg)   SpO2 96%   BMI 38.74 kg/m²     Wt Readings from Last 3 Encounters:   09/09/22 232 lb 12.8 oz (105.6 kg)   06/10/22 231 lb (104.8 kg)   03/31/22 238 lb 6.4 oz (108.1 kg)       Physical Exam    Physical Exam  Vitals and nursing note reviewed. Constitutional:       General: She is not in acute distress. Appearance: She is not ill-appearing. HENT:      Head: Normocephalic and atraumatic. Right Ear: Tympanic membrane normal.      Left Ear: Tympanic membrane normal.      Mouth/Throat:      Mouth: Mucous membranes are moist.   Eyes:      Conjunctiva/sclera: Conjunctivae normal.   Neck:      Thyroid: No thyromegaly or thyroid tenderness. Vascular: No carotid bruit. Cardiovascular:      Rate and Rhythm: Normal rate and regular rhythm. Pulses: Normal pulses. Pulmonary:      Effort: Pulmonary effort is normal.      Breath sounds: Normal breath sounds. Abdominal:      General: Bowel sounds are normal.      Palpations: Abdomen is soft. Musculoskeletal:      Cervical back: Pain with movement and muscular tenderness present. Feet:      Right foot:      Toenail Condition: Right toenails are abnormally thick. Left foot:      Toenail Condition: Left toenails are abnormally thick.       Comments: Diabetic foot exam:   Left Foot:   Visual Exam: callous- heel     Pulse DP: 2+ (normal)   Filament test: reduced sensation   Vibratory Sensation: diminished  Right Foot:   Visual Exam: callous- heel   Pulse DP: 2+ (normal)   Filament test: reduced sensation   Vibratory Sensation: diminished    Lymphadenopathy:      Cervical: No cervical adenopathy. Skin:     General: Skin is warm and dry. Neurological:      Mental Status: She is alert. Psychiatric:         Mood and Affect: Mood normal.         Behavior: Behavior normal.         Thought Content: Thought content normal.           Assessment & Plan    Encounter Diagnoses   Name Primary? Essential hypertension Yes    Controlled type 2 diabetes mellitus without complication, without long-term current use of insulin (HCC)     Mixed hyperlipidemia     Anxiety     Irregular periods/menstrual cycles     Encounter for long-term (current) use of medications        Orders Placed This Encounter   Medications    omeprazole (PRILOSEC) 10 MG delayed release capsule     Sig: Take 1 capsule by mouth daily     Dispense:  30 capsule     Refill:  5    empagliflozin (JARDIANCE) 25 MG tablet     Sig: Take 1 tablet by mouth daily TAKE 1 TABLET BY MOUTH EVERY MORNING FOR DIABETES     Dispense:  30 tablet     Refill:  5    losartan-hydroCHLOROthiazide (HYZAAR) 100-12.5 MG per tablet     Sig: Take 1 tablet by mouth daily TAKE 1 TABLET BY MOUTH EVERY DAY     Dispense:  30 tablet     Refill:  5    LORazepam (ATIVAN) 1 MG tablet     Sig: Take 1 tablet by mouth 2 times daily.  Take 1/2 to one tablet twice a day as needed Indications: anxious     Dispense:  60 tablet     Refill:  5    Semaglutide, 2 MG/DOSE, (OZEMPIC, 2 MG/DOSE,) 8 MG/3ML SOPN     Sig: Inject 2 mg into the skin every 7 days     Dispense:  3 mL     Refill:  5       Orders Placed This Encounter   Procedures    Hemoglobin A1C     Standing Status:   Future     Number of Occurrences:   1     Standing Expiration Date:   9/9/2023    TSH with Reflex     Standing Status:   Future     Number of Occurrences:   1     Standing Expiration Date:   5/3/6832    Basic Metabolic Panel     Standing Status:   Future     Number of Occurrences:   1     Standing Expiration Date:   9/9/2023    Magnesium     Standing Status:   Future     Number of Occurrences:   1     Standing Expiration Date:   9/9/2023    CBC with Auto Differential     Standing Status:   Future     Number of Occurrences:   1     Standing Expiration Date:   9/9/2023     Increase Ozempic for better BS control. The patient is asked to make an attempt to improve diet and exercise patterns to aid in medical management of this problem. Discussed the importance of regular exercise and a healthy diet. HTN controlled continue with Hyzaar. Likely perimenopausal. Keep diary of her menstrual cycle. Review at follow up. Return in about 4 months (around 1/9/2023) for routine follow up of chronic medical issues.         Killian Nagel MD

## 2022-09-10 DIAGNOSIS — R71.8 ELEVATED RED BLOOD CELL COUNT: ICD-10-CM

## 2022-09-10 DIAGNOSIS — E87.6 HYPOKALEMIA: Primary | ICD-10-CM

## 2022-09-10 DIAGNOSIS — Z79.899 ENCOUNTER FOR LONG-TERM (CURRENT) USE OF MEDICATIONS: ICD-10-CM

## 2022-09-10 DIAGNOSIS — E83.42 HYPOMAGNESEMIA: ICD-10-CM

## 2022-09-10 LAB
BASOPHILS # BLD: 0.1 K/UL (ref 0–0.2)
BASOPHILS NFR BLD: 1 % (ref 0–2)
DIFFERENTIAL METHOD BLD: ABNORMAL
EOSINOPHIL # BLD: 0.7 K/UL (ref 0–0.8)
EOSINOPHIL NFR BLD: 7 % (ref 0.5–7.8)
ERYTHROCYTE [DISTWIDTH] IN BLOOD BY AUTOMATED COUNT: 12.7 % (ref 11.9–14.6)
HCT VFR BLD AUTO: 47.9 % (ref 35.8–46.3)
HGB BLD-MCNC: 15.9 G/DL (ref 11.7–15.4)
IMM GRANULOCYTES # BLD AUTO: 0 K/UL (ref 0–0.5)
IMM GRANULOCYTES NFR BLD AUTO: 0 % (ref 0–5)
LYMPHOCYTES # BLD: 3.1 K/UL (ref 0.5–4.6)
LYMPHOCYTES NFR BLD: 32 % (ref 13–44)
MCH RBC QN AUTO: 32.5 PG (ref 26.1–32.9)
MCHC RBC AUTO-ENTMCNC: 33.2 G/DL (ref 31.4–35)
MCV RBC AUTO: 98 FL (ref 79.6–97.8)
MONOCYTES # BLD: 0.5 K/UL (ref 0.1–1.3)
MONOCYTES NFR BLD: 5 % (ref 4–12)
NEUTS SEG # BLD: 5.4 K/UL (ref 1.7–8.2)
NEUTS SEG NFR BLD: 55 % (ref 43–78)
NRBC # BLD: 0 K/UL (ref 0–0.2)
PLATELET # BLD AUTO: 272 K/UL (ref 150–450)
PMV BLD AUTO: 11.9 FL (ref 9.4–12.3)
RBC # BLD AUTO: 4.89 M/UL (ref 4.05–5.2)
WBC # BLD AUTO: 9.8 K/UL (ref 4.3–11.1)

## 2022-09-10 RX ORDER — POTASSIUM CHLORIDE 750 MG/1
10 CAPSULE, EXTENDED RELEASE ORAL 2 TIMES DAILY
Qty: 60 CAPSULE | Refills: 3 | Status: SHIPPED | OUTPATIENT
Start: 2022-09-10

## 2022-09-10 RX ORDER — MAGNESIUM OXIDE 400 MG/1
400 TABLET ORAL DAILY
Qty: 30 TABLET | Refills: 1 | Status: SHIPPED | OUTPATIENT
Start: 2022-09-10

## 2022-09-11 ASSESSMENT — ENCOUNTER SYMPTOMS
SHORTNESS OF BREATH: 0
ABDOMINAL PAIN: 0

## 2022-09-12 ENCOUNTER — TELEPHONE (OUTPATIENT)
Dept: INTERNAL MEDICINE CLINIC | Facility: CLINIC | Age: 47
End: 2022-09-12

## 2022-09-12 NOTE — TELEPHONE ENCOUNTER
----- Message from Arin Burris MD sent at 9/10/2022 10:27 AM EDT -----  Please call and notify patient:   Kidney function has improved compared to 3 months ago. Magnesium and potassium are low. I will send in rx for medications. Repeat labs in [facility] in 2 weeks. Keep hydrated. Thyroid is  okay  A1c is still pending. Will update you when that results is back. Also check iron and ferritin when she comes back in. Her RBC are elevated. She may not need as much iron.   Labs ordred  Arin Burris MD

## 2022-09-13 LAB
EST. AVERAGE GLUCOSE BLD GHB EST-MCNC: 151 MG/DL
HBA1C MFR BLD: 6.9 % (ref 4.8–5.6)

## 2022-09-13 NOTE — DISCHARGE SUMMARY
Hospitalist Discharge Summary     Patient ID:  Kobe Upton  [id number]  2 y.o.  1975  Admit date: 8/3/2019  7:00 AM  Discharge date and time: 8/6/2019  Attending: Tammy Amato MD  PCP:  Aramis Mead MD  Treatment Team: Attending Provider: Tammy Amato MD; Consulting Provider: Rafy Amaya MD; Care Manager: Leslye Martinez LMSW    Principal Diagnosis Acute renal failure (ARF) [facility])   Principal Problem:    Acute renal failure (ARF) ([facility] [address] (8/3/2019)    Active Problems:    HTN (hypertension) (10/3/2013)      MARILY (obstructive sleep apnea) (1/7/2016)      Mixed hyperlipidemia (5/9/2017)      Obesity, morbid ([facility] [address] (5/1/2018)      Type 2 diabetes with nephropathy ([facility] [address] (5/1/2018)      Renal stone (8/3/2019)             Hospital Course:  Please refer to the admission H&P for details of presentation. In summary, the patient is a 36yo F with hx DM, HTN, anxiety who presented after several days of nausea, decreased PO intake, and a hypoglycemic episode. Found to have acute renal failure and R kidney stone. Urology and nephrology consulted. Underwent stent placement and laser lithotripsy on 8/4. Well tolerated and Cr improving rapidly. Stent in place and will follow with uro as an outpatient. Hold metformin until labs rechecked at follow-up. Provided new glyburide Rx. Significant Diagnostic Studies:   XR ABD (KUB)   Final Result   IMPRESSION: Kidney stone noted on CT is not visible on the plain film. CT UROGRAM WO CONT   Final Result   IMPRESSION: 9 mm calculus in the left renal pelvis. Minimal perinephric   stranding without hydronephrosis. XR CHEST PORT   Final Result   IMPRESSION:    1.   No acute cardiopulmonary process evident on single frontal view of the   chest.                     Labs: Results:       Chemistry Recent Labs     08/06/19  1133 08/05/19  0622 08/04/19  0705   * 213* 106*    132* 130*   K 3.6 3.4* 3.8    95* 93*   CO2 30 27 27   BUN 18 33* 35*   CREA 2.12* 3.80* 5.82*   CA 8.5 8.1* 8.2*   AGAP 6* 10 10      CBC w/Diff Recent Labs     08/04/19  0705   WBC 5.1   RBC 3.27*   HGB 8.7*   HCT 26.5*         Cardiac Enzymes Recent Labs     08/03/19  1725   *      Coagulation No results for input(s): PTP, INR, APTT in the last 72 hours. No lab exists for component: INREXT    Lipid Panel Lab Results   Component Value Date/Time    Cholesterol, total 163 04/25/2019 08:49 AM    HDL Cholesterol 36 (L) 04/25/2019 08:49 AM    LDL, calculated Comment 04/25/2019 08:49 AM    VLDL, calculated Comment 04/25/2019 08:49 AM    Triglyceride 401 (H) 04/25/2019 08:49 AM      BNP No results for input(s): BNPP in the last 72 hours. Liver Enzymes No results for input(s): TP, ALB, TBIL, AP, SGOT, GPT in the last 72 hours. No lab exists for component: DBIL   Thyroid Studies Lab Results   Component Value Date/Time    TSH 2.440 07/16/2019 03:45 PM            Discharge Exam:  Visit Vitals  BP (!) 132/91 (BP 1 Location: Right arm, BP Patient Position: At rest)   Pulse 94   Temp 98.4 °F (36.9 °C)   Resp 16   Ht 5' 5\" (1.651 m)   Wt 100.3 kg (221 lb 3.2 oz)   SpO2 95%   BMI 36.81 kg/m²     General appearance: alert, cooperative, no distress, appears stated age   Lungs: clear to auscultation bilaterally  Heart: regular rate and rhythm, S1, S2 normal, no murmur, click, rub or gallop  Abdomen: soft, non-tender. Bowel sounds normal. No masses,  no organomegaly  Extremities: no cyanosis or edema  Neurologic: Grossly normal    Disposition: home  Discharge Condition: stable  Patient Instructions:   Current Discharge Medication List      START taking these medications    Details   glyBURIDE (DIABETA) 5 mg tablet Take 1 Tab by mouth two (2) times daily (with meals). Qty: 60 Tab, Refills: 0      HYDROcodone-acetaminophen (NORCO) 5-325 mg per tablet Take 1 Tab by mouth every six (6) hours as needed for Pain for up to 3 days. Max Daily Amount: 4 Tabs.   Qty: 10 Tab, Refills: 0    Associated Diagnoses: Renal stone      bisacodyl (DULCOLAX) 5 mg EC tablet Take 1 Tab by mouth daily as needed for Constipation. Qty: 30 Tab, Refills: 0      polyethylene glycol (MIRALAX) 17 gram/dose powder Take 17 g by mouth daily. Qty: 235 g, Refills: 0         CONTINUE these medications which have NOT CHANGED    Details   omeprazole (PRILOSEC) 10 mg capsule Take 1 Cap by mouth daily. Qty: 30 Cap, Refills: 3    Associated Diagnoses: Upper abdominal pain      fluticasone propionate (FLONASE) 50 mcg/actuation nasal spray       ondansetron (ZOFRAN ODT) 4 mg disintegrating tablet Take 1 Tab by mouth every eight (8) hours as needed for Nausea. Qty: 12 Tab, Refills: 1    Associated Diagnoses: Nausea      norethindrone (JASON) 0.35 mg tab Take 1 Tab by mouth daily. Qty: 28 Tab, Refills: 11    Associated Diagnoses: Irregular menses      atorvastatin (LIPITOR) 40 mg tablet TAKE ONE TABLET BY MOUTH ONCE DAILY. Qty: 30 Tab, Refills: 6    Associated Diagnoses: Mixed hyperlipidemia      LORazepam (ATIVAN) 1 mg tablet Take 1/2 to one tablet twice a day as needed  Qty: 30 Tab, Refills: 0    Associated Diagnoses: Anxiety      buPROPion XL (WELLBUTRIN XL) 150 mg tablet Take 1 Tab by mouth every morning. Qty: 30 Tab, Refills: 11    Associated Diagnoses: Depression, unspecified depression type      DULoxetine (CYMBALTA) 60 mg capsule Take one capsule a day  Qty: 30 Cap, Refills: 11    Associated Diagnoses: Other depression      losartan-hydroCHLOROthiazide (HYZAAR) 100-12.5 mg per tablet TAKE ONE TABLET BY MOUTH ONCE DAILY. Qty: 30 Tab, Refills: 10    Associated Diagnoses: Essential hypertension      !! cpap machine kit by Does Not Apply route. Replacement machine  Qty: 1 Kit, Refills: 0    Associated Diagnoses: Sleep apnea, unspecified type      OTHER CPAP supplies  Qty: 1 Units, Refills: 0    Associated Diagnoses: Sleep apnea, unspecified type      Ferrous Fumarate 325 mg (106 mg iron) tab Take  by mouth. levocetirizine (XYZAL) 5 mg tablet Take 1 Tab by mouth daily. Dx:477.9 - failed allegra, claritin, zyrtec and flonase  Qty: 30 Tab, Refills: 11    Associated Diagnoses: Seasonal allergic rhinitis due to pollen      !! cpap machine kit by Does Not Apply route. 14cm      glucose blood VI test strips (ONETOUCH ULTRA TEST) strip Test blood sugar daily, either fasting or 2 hours after eating for diabetes and record in log. (#100)  Dx: 250.00 DM  Qty: 1 Each, Refills: 11    Associated Diagnoses: Type 2 diabetes mellitus without complication (HCC)      aspirin delayed-release 81 mg tablet Take  by mouth daily. !! - Potential duplicate medications found. Please discuss with provider. STOP taking these medications       cefUROXime (CEFTIN) 250 mg tablet Comments:   Reason for Stopping:         glyBURIDE-metFORMIN (GLUCOVANCE) 2.5-500 mg per tablet Comments:   Reason for Stopping:               Activity: Activity as tolerated  Diet: Resume previous diet    Follow-up:     Follow-up Appointments   Procedures    FOLLOW UP VISIT Appointment in: Other ([facility]) 1w PCP, with urology as directed (office will call), nephrology 1mo     1w PCP, with urology as directed (office will call), nephrology 1mo     Standing Status:   Standing     Number of Occurrences:   1     Order Specific Question:   Appointment in     Answer:    Other (Specify)           Time spent to discharge patient 35 minutes  Signed:  Jason Mehta MD  8/6/2019  2:25 PM Ed Adonay

## 2022-09-21 ENCOUNTER — TELEPHONE (OUTPATIENT)
Dept: INTERNAL MEDICINE CLINIC | Facility: CLINIC | Age: 47
End: 2022-09-21

## 2022-10-04 ENCOUNTER — NURSE ONLY (OUTPATIENT)
Dept: INTERNAL MEDICINE CLINIC | Facility: CLINIC | Age: 47
End: 2022-10-04

## 2022-10-04 DIAGNOSIS — Z79.899 ENCOUNTER FOR LONG-TERM (CURRENT) USE OF MEDICATIONS: ICD-10-CM

## 2022-10-04 DIAGNOSIS — E87.6 HYPOKALEMIA: ICD-10-CM

## 2022-10-04 DIAGNOSIS — E83.42 HYPOMAGNESEMIA: ICD-10-CM

## 2022-10-04 DIAGNOSIS — R71.8 ELEVATED RED BLOOD CELL COUNT: ICD-10-CM

## 2022-10-04 LAB
ANION GAP SERPL CALC-SCNC: 8 MMOL/L (ref 4–13)
BUN SERPL-MCNC: 19 MG/DL (ref 6–23)
CALCIUM SERPL-MCNC: 10.1 MG/DL (ref 8.3–10.4)
CHLORIDE SERPL-SCNC: 104 MMOL/L (ref 101–110)
CO2 SERPL-SCNC: 24 MMOL/L (ref 21–32)
CREAT SERPL-MCNC: 1.4 MG/DL (ref 0.6–1)
FERRITIN SERPL-MCNC: 87 NG/ML (ref 8–388)
GLUCOSE SERPL-MCNC: 152 MG/DL (ref 65–100)
IRON SERPL-MCNC: 70 UG/DL (ref 35–150)
MAGNESIUM SERPL-MCNC: 1.8 MG/DL (ref 1.8–2.4)
POTASSIUM SERPL-SCNC: 4 MMOL/L (ref 3.5–5.1)
SODIUM SERPL-SCNC: 136 MMOL/L (ref 136–145)

## 2022-11-04 ENCOUNTER — TELEPHONE (OUTPATIENT)
Dept: INTERNAL MEDICINE CLINIC | Facility: CLINIC | Age: 47
End: 2022-11-04

## 2022-11-04 NOTE — TELEPHONE ENCOUNTER
Pt called and stated that she is not able to get her script for Ozempic filled. [facility] in [place] does not have it in stock and they told the pt that there is a shortage and that it is on back order. She is unsure if there is another pharmacy that may have it in stock or what she should do. Please call pt.

## 2022-11-21 ENCOUNTER — TELEPHONE (OUTPATIENT)
Dept: INTERNAL MEDICINE CLINIC | Facility: CLINIC | Age: 47
End: 2022-11-21

## 2022-11-21 DIAGNOSIS — J06.9 UPPER RESPIRATORY TRACT INFECTION, UNSPECIFIED TYPE: Primary | ICD-10-CM

## 2022-11-21 RX ORDER — MAGNESIUM OXIDE TAB 400 MG (241.3 MG ELEMENTAL MG) 400 (241.3 MG) MG
TAB ORAL
Qty: 30 TABLET | Refills: 11 | Status: SHIPPED | OUTPATIENT
Start: 2022-11-21

## 2022-11-21 RX ORDER — BENZONATATE 200 MG/1
200 CAPSULE ORAL 3 TIMES DAILY PRN
Qty: 30 CAPSULE | Refills: 0 | Status: SHIPPED | OUTPATIENT
Start: 2022-11-21 | End: 2022-11-28

## 2022-11-21 RX ORDER — AZITHROMYCIN 250 MG/1
250 TABLET, FILM COATED ORAL SEE ADMIN INSTRUCTIONS
Qty: 6 TABLET | Refills: 0 | Status: SHIPPED | OUTPATIENT
Start: 2022-11-21 | End: 2022-11-26

## 2022-11-21 NOTE — TELEPHONE ENCOUNTER
Patient stated she had a virtual visit 11/3 however she is still having sinus issues and wanted to know if she can get more antibiotics to help and cough medicine. She is still coughing and having nose bleeds. Please advise .

## 2022-11-23 RX ORDER — GLIMEPIRIDE 4 MG/1
TABLET ORAL
Qty: 60 TABLET | Refills: 5 | Status: SHIPPED | OUTPATIENT
Start: 2022-11-23

## 2022-12-15 ENCOUNTER — PATIENT MESSAGE (OUTPATIENT)
Dept: OBGYN CLINIC | Age: 47
End: 2022-12-15

## 2022-12-15 RX ORDER — ACETAMINOPHEN AND CODEINE PHOSPHATE 120; 12 MG/5ML; MG/5ML
0.35 SOLUTION ORAL DAILY
Qty: 28 TABLET | Refills: 1 | Status: SHIPPED | OUTPATIENT
Start: 2022-12-15

## 2022-12-15 NOTE — TELEPHONE ENCOUNTER
From: Morteza Hawkins  To: Dr. Akhtar Neat: 12/15/2022 1:19 PM EST  Subject: Sunday Ross Appointment    Could there be a 2 refills called in for my [place] . 35 mg until I see Dr Ryan [place] on January 19th, 2023? Pharmacy [facility] in [place].     Thank you  [facility]

## 2022-12-22 RX ORDER — POTASSIUM CHLORIDE 750 MG/1
CAPSULE, EXTENDED RELEASE ORAL
Qty: 60 CAPSULE | Refills: 3 | Status: SHIPPED | OUTPATIENT
Start: 2022-12-22

## 2023-01-10 ENCOUNTER — OFFICE VISIT (OUTPATIENT)
Dept: INTERNAL MEDICINE CLINIC | Facility: CLINIC | Age: 48
End: 2023-01-10
Payer: COMMERCIAL

## 2023-01-10 VITALS
TEMPERATURE: 97.4 F | HEART RATE: 99 BPM | DIASTOLIC BLOOD PRESSURE: 78 MMHG | RESPIRATION RATE: 18 BRPM | HEIGHT: 65 IN | OXYGEN SATURATION: 97 % | BODY MASS INDEX: 37.82 KG/M2 | SYSTOLIC BLOOD PRESSURE: 109 MMHG | WEIGHT: 227 LBS

## 2023-01-10 DIAGNOSIS — E78.2 MIXED HYPERLIPIDEMIA: ICD-10-CM

## 2023-01-10 DIAGNOSIS — I10 ESSENTIAL HYPERTENSION: ICD-10-CM

## 2023-01-10 DIAGNOSIS — F33.1 MODERATE EPISODE OF RECURRENT MAJOR DEPRESSIVE DISORDER (HCC): ICD-10-CM

## 2023-01-10 DIAGNOSIS — N18.32 STAGE 3B CHRONIC KIDNEY DISEASE (HCC): ICD-10-CM

## 2023-01-10 DIAGNOSIS — Z23 ENCOUNTER FOR IMMUNIZATION: ICD-10-CM

## 2023-01-10 DIAGNOSIS — N18.32 TYPE 2 DIABETES MELLITUS WITH STAGE 3B CHRONIC KIDNEY DISEASE, WITHOUT LONG-TERM CURRENT USE OF INSULIN (HCC): Primary | ICD-10-CM

## 2023-01-10 DIAGNOSIS — R22.9 SUBCUTANEOUS MASS: ICD-10-CM

## 2023-01-10 DIAGNOSIS — E11.22 TYPE 2 DIABETES MELLITUS WITH STAGE 3B CHRONIC KIDNEY DISEASE, WITHOUT LONG-TERM CURRENT USE OF INSULIN (HCC): Primary | ICD-10-CM

## 2023-01-10 DIAGNOSIS — F41.9 ANXIETY: ICD-10-CM

## 2023-01-10 PROCEDURE — 2022F DILAT RTA XM EVC RTNOPTHY: CPT | Performed by: INTERNAL MEDICINE

## 2023-01-10 PROCEDURE — 1036F TOBACCO NON-USER: CPT | Performed by: INTERNAL MEDICINE

## 2023-01-10 PROCEDURE — G8482 FLU IMMUNIZE ORDER/ADMIN: HCPCS | Performed by: INTERNAL MEDICINE

## 2023-01-10 PROCEDURE — 3078F DIAST BP <80 MM HG: CPT | Performed by: INTERNAL MEDICINE

## 2023-01-10 PROCEDURE — 99214 OFFICE O/P EST MOD 30 MIN: CPT | Performed by: INTERNAL MEDICINE

## 2023-01-10 PROCEDURE — 3044F HG A1C LEVEL LT 7.0%: CPT | Performed by: INTERNAL MEDICINE

## 2023-01-10 PROCEDURE — 90674 CCIIV4 VAC NO PRSV 0.5 ML IM: CPT | Performed by: INTERNAL MEDICINE

## 2023-01-10 PROCEDURE — 3074F SYST BP LT 130 MM HG: CPT | Performed by: INTERNAL MEDICINE

## 2023-01-10 PROCEDURE — 90471 IMMUNIZATION ADMIN: CPT | Performed by: INTERNAL MEDICINE

## 2023-01-10 PROCEDURE — G8417 CALC BMI ABV UP PARAM F/U: HCPCS | Performed by: INTERNAL MEDICINE

## 2023-01-10 PROCEDURE — G8427 DOCREV CUR MEDS BY ELIG CLIN: HCPCS | Performed by: INTERNAL MEDICINE

## 2023-01-10 RX ORDER — METOCLOPRAMIDE 10 MG/1
10 TABLET ORAL 3 TIMES DAILY
COMMUNITY

## 2023-01-10 RX ORDER — LOSARTAN POTASSIUM AND HYDROCHLOROTHIAZIDE 12.5; 1 MG/1; MG/1
1 TABLET ORAL DAILY
Qty: 30 TABLET | Refills: 5 | Status: SHIPPED | OUTPATIENT
Start: 2023-01-10

## 2023-01-10 RX ORDER — LORAZEPAM 1 MG/1
1 TABLET ORAL 2 TIMES DAILY
Qty: 60 TABLET | Refills: 5 | Status: SHIPPED | OUTPATIENT
Start: 2023-01-10 | End: 2024-01-10

## 2023-01-10 RX ORDER — ATORVASTATIN CALCIUM 40 MG/1
TABLET, FILM COATED ORAL
Qty: 30 TABLET | Refills: 11 | Status: SHIPPED | OUTPATIENT
Start: 2023-01-10

## 2023-01-10 RX ORDER — SEMAGLUTIDE 2.68 MG/ML
2 INJECTION, SOLUTION SUBCUTANEOUS
Qty: 3 ML | Refills: 5 | Status: SHIPPED | OUTPATIENT
Start: 2023-01-10

## 2023-01-10 RX ORDER — DULOXETIN HYDROCHLORIDE 60 MG/1
CAPSULE, DELAYED RELEASE ORAL
Qty: 60 CAPSULE | Refills: 5 | Status: SHIPPED | OUTPATIENT
Start: 2023-01-10

## 2023-01-10 RX ORDER — DULOXETIN HYDROCHLORIDE 30 MG/1
CAPSULE, DELAYED RELEASE ORAL
Qty: 30 CAPSULE | Refills: 5 | Status: SHIPPED | OUTPATIENT
Start: 2023-01-10

## 2023-01-10 ASSESSMENT — PATIENT HEALTH QUESTIONNAIRE - PHQ9
SUM OF ALL RESPONSES TO PHQ QUESTIONS 1-9: 2
1. LITTLE INTEREST OR PLEASURE IN DOING THINGS: 1
SUM OF ALL RESPONSES TO PHQ9 QUESTIONS 1 & 2: 2
SUM OF ALL RESPONSES TO PHQ QUESTIONS 1-9: 2
2. FEELING DOWN, DEPRESSED OR HOPELESS: 1

## 2023-01-10 NOTE — PROGRESS NOTES
.    01/10/2023   Location:[facility] PHYSICIAN SERVICES  [facility] [place] MEDICINE  [place]  Patient #:  [id number]  YOB: 1975        History of Present Illness     Chief Complaint   Patient presents with    Follow-up Chronic Condition     4 month f/u    Skin Problem     Bump on right ankle    Diabetes    Hypertension    Obesity       Ms. Hawkins is a 47 y.o. female  who presents for Follow up on chronic medical issues. There is compliance and tolerance with medications  Chronic active medical issues DM, HTN, CKD, HLD,Obesity, anxiety and depression, diabetic neuropathy.  HTN controlled on current Hyzaar  Fluctuating BS. Higher in the mornings. Jardiance, Amaryl, and Ozempic,  Did not bring in BS readings or CGM reader.  HLD on statin  Anxiety and depression treated with PRN Ativan and Cymbalta which also treats her neuropathy. She sees a counselor.   Has upcoming appt with GI for screening colonoscopy.      Allergies   Allergen Reactions    Amoxicillin Other (See Comments)     Itching     Oxycodone-Acetaminophen Itching     Past Medical History:   Diagnosis Date    MIRNA (acute kidney injury) ([facility])     saw nephrologist 2/26/14.  [place] due to volume depletion, meds.  Meds adjusted, now f/u with PCP    Diabetes ([facility]) 2011    [place], avgfbs 115, last HA1C 6.6, does not experience hypo    Gall bladder disease     GERD (gastroesophageal reflux disease)     controlled with protonix    Hypercholesterolemia     Hypertension     well controlled by medication    Morbid obesity ([facility])     BMI 41.7    Psychiatric disorder     depression , controlled by cymbalta    Sleep apnea      Social History     Socioeconomic History    Marital status:      Spouse name: None    Number of children: None    Years of education: None    Highest education level: None   Tobacco Use    Smoking status: Never    Smokeless tobacco: Never   Substance and Sexual Activity    Alcohol use: Yes     Alcohol/week: 0.0 standard drinks     Drug use: No     Social Determinants of Health     Financial Resource Strain: Low Risk     Difficulty of Paying Living Expenses: Not hard at all   Food Insecurity: No Food Insecurity    Worried About Running Out of Food in the Last Year: Never true    Ran Out of Food in the Last Year: Never true     Past Surgical History:   Procedure Laterality Date    CHOLECYSTECTOMY      COLONOSCOPY      NEPHROSTOMY  08/01/2019    Dr. Faustin    TONSILLECTOMY       Family History   Adopted: Yes   Problem Relation Age of Onset    Breast Cancer Neg Hx      Current Outpatient Medications   Medication Sig Dispense Refill    metoclopramide (REGLAN) 10 MG tablet Take 10 mg by mouth in the morning, at noon, and at bedtime      potassium chloride (MICRO-K) 10 MEQ extended release capsule TAKE 1 CAPSULE BY MOUTH TWICE DAILY 60 capsule 3    norethindrone (MICRONOR) 0.35 MG tablet Take 1 tablet by mouth daily 28 tablet 1    glimepiride (AMARYL) 4 MG tablet TAKE 1 TABLET BY MOUTH TWICE DAILY WITH MEALS 60 tablet 5    MAGNESIUM-OXIDE 400 (240 Mg) MG tablet TAKE 1 TABLET BY MOUTH DAILY 30 tablet 11    omeprazole (PRILOSEC) 10 MG delayed release capsule Take 1 capsule by mouth daily 30 capsule 5    empagliflozin (JARDIANCE) 25 MG tablet Take 1 tablet by mouth daily TAKE 1 TABLET BY MOUTH EVERY MORNING FOR DIABETES 30 tablet 5    losartan-hydroCHLOROthiazide (HYZAAR) 100-12.5 MG per tablet Take 1 tablet by mouth daily TAKE 1 TABLET BY MOUTH EVERY DAY 30 tablet 5    LORazepam (ATIVAN) 1 MG tablet Take 1 tablet by mouth 2 times daily. Take 1/2 to one tablet twice a day as needed Indications: anxious 60 tablet 5    Semaglutide, 2 MG/DOSE, (OZEMPIC, 2 MG/DOSE,) 8 MG/3ML SOPN Inject 2 mg into the skin every 7 days 3 mL 5    Continuous Blood Gluc Transmit (DEXCOM G6 TRANSMITTER) MISC Check glucose daily 10 each 5    Continuous Blood Gluc Sensor (DEXCOM G6 SENSOR) MISC Check glucose daily 10 each 5    Continuous Blood Gluc  (DEXCOM G6  ) MARGARETH Check glucose daily 1 each 0    Ivermectin 1 % CREA APPLY TOPICALLY TO CLEAN DRY FACE TWICE DAILY FOR 2 WEEKS THEN AS NEEDED FLARES      triamcinolone (KENALOG) 0.1 % cream APPLY TO AFFECTED RASH ON ARM TWICE DAILY FOR 14 DAYS THEN STOP. DO NOT USE ON THE FACE      APPLE CIDER VINEGAR PO Take by mouth      ascorbic acid (VITAMIN C) 250 MG tablet Take by mouth      aspirin 81 MG EC tablet Take by mouth daily      atorvastatin (LIPITOR) 40 MG tablet TAKE 1 TABLET BY MOUTH EVERY DAY      Biotin 2.5 MG CAPS Take by mouth      vitamin D (CHOLECALCIFEROL) 25 MCG (1000 UT) TABS tablet Take by mouth daily      DULoxetine (CYMBALTA) 30 MG extended release capsule 1 po qd with the 60 mg capsules      DULoxetine (CYMBALTA) 60 MG extended release capsule 2 po qd for mood Indications: major depressive disorder      ferrous gluconate (FERGON) 240 (27 Fe) MG tablet Take 240 mg by mouth 3 times daily (with meals)      fluticasone (FLONASE) 50 MCG/ACT nasal spray 2 sprays by Nasal route daily      levocetirizine (XYZAL) 5 MG tablet Take 5 mg by mouth daily      ondansetron (ZOFRAN-ODT) 4 MG disintegrating tablet Take 4 mg by mouth every 8 hours as needed      SUMAtriptan (IMITREX) 100 MG tablet TAKE 1/2 TO 1 TABLET BY MOUTH AT ONSET OF MIGRAINE MAY REPEAT IN 2 HOURS IF NEEDED       No current facility-administered medications for this visit.     Health Maintenance   Topic Date Due    HIV screen  Never done    Hepatitis C screen  Never done    Hepatitis B vaccine (1 of 3 - Risk 3-dose series) Never done    Diabetic retinal exam  05/19/2019    Colorectal Cancer Screen  Never done    COVID-19 Vaccine (4 - Booster for Moderna series) 03/11/2022    Diabetic Alb to Cr ratio (uACR) test  03/25/2023    Diabetic foot exam  06/10/2023    Lipids  06/10/2023    A1C test (Diabetic or Prediabetic)  09/09/2023    Depression Monitoring  09/09/2023    GFR test (Diabetes, CKD 3-4, OR last GFR 15-59)  10/04/2023    DTaP/Tdap/Td vaccine  (2 - Td or Tdap) 07/14/2025    Cervical cancer screen  01/27/2026    Flu vaccine  Completed    Pneumococcal 0-64 years Vaccine  Completed    Hepatitis A vaccine  Aged Out    Hib vaccine  Aged Out    Meningococcal (ACWY) vaccine  Aged Out             Review of Systems  Review of Systems    /78 (Site: Right Upper Arm, Position: Sitting)   Pulse 99   Temp 97.4 °F (36.3 °C) (Temporal)   Resp 18   Ht 5' 5\" (1.651 m)   Wt 227 lb (103 kg)   LMP 06/15/2022 (Approximate)   SpO2 97%   BMI 37.77 kg/m²     Wt Readings from Last 3 Encounters:   01/10/23 227 lb (103 kg)   09/09/22 232 lb 12.8 oz (105.6 kg)   06/10/22 231 lb (104.8 kg)       Physical Exam    Physical Exam  Vitals and nursing note reviewed. Constitutional:       General: She is not in acute distress. Appearance: Normal appearance. She is not ill-appearing. HENT:      Head: Normocephalic and atraumatic. Cardiovascular:      Rate and Rhythm: Normal rate and regular rhythm. Pulses: Normal pulses. Pulmonary:      Effort: Pulmonary effort is normal.      Breath sounds: Normal breath sounds. Abdominal:      General: Bowel sounds are normal.      Palpations: Abdomen is soft. Musculoskeletal:      Cervical back: Normal range of motion. Legs:       Comments: Very mobile mass 1.5x 1cm under skin with smaller satelitte  lesion. Seems to be attached to lesion just above to the right of largest lesion. Skin:     General: Skin is warm and dry. Neurological:      Mental Status: She is alert. Assessment & Plan    Encounter Diagnoses   Name Primary?     Type 2 diabetes mellitus with stage 3b chronic kidney disease, without long-term current use of insulin (HCC) Yes    Essential hypertension     Stage 3b chronic kidney disease ([address]     Anxiety     Encounter for immunization     Mixed hyperlipidemia     Subcutaneous mass     Moderate episode of recurrent major depressive disorder ([address]        Orders Placed This Encounter Medications    losartan-hydroCHLOROthiazide (HYZAAR) 100-12.5 MG per tablet     Sig: Take 1 tablet by mouth daily TAKE 1 TABLET BY MOUTH EVERY DAY     Dispense:  30 tablet     Refill:  5    DULoxetine (CYMBALTA) 30 MG extended release capsule     Si po qd with the 60 mg capsules     Dispense:  30 capsule     Refill:  5    empagliflozin (JARDIANCE) 25 MG tablet     Sig: Take 1 tablet by mouth daily TAKE 1 TABLET BY MOUTH EVERY MORNING FOR DIABETES     Dispense:  30 tablet     Refill:  5    DULoxetine (CYMBALTA) 60 MG extended release capsule     Si po qd for mood Indications: major depressive disorder     Dispense:  60 capsule     Refill:  5    atorvastatin (LIPITOR) 40 MG tablet     Sig: TAKE 1 TABLET BY MOUTH EVERY DAY     Dispense:  30 tablet     Refill:  11    LORazepam (ATIVAN) 1 MG tablet     Sig: Take 1 tablet by mouth 2 times daily. Take 1/2 to one tablet twice a day as needed Indications: anxious     Dispense:  60 tablet     Refill:  5    Semaglutide, 2 MG/DOSE, (OZEMPIC, 2 MG/DOSE,) 8 MG/3ML SOPN     Sig: Inject 2 mg into the skin every 7 days     Dispense:  3 mL     Refill:  5       Orders Placed This Encounter   Procedures    Influenza, FLUCELVAX, (age 10 mo+), IM, Preservative Free, 0.5 mL     Check labs to   Return for lesion removable. No follow-ups on file.         Guille Frausto MD

## 2023-01-17 ENCOUNTER — NURSE ONLY (OUTPATIENT)
Dept: INTERNAL MEDICINE CLINIC | Facility: CLINIC | Age: 48
End: 2023-01-17

## 2023-01-17 DIAGNOSIS — Z79.899 ENCOUNTER FOR LONG-TERM (CURRENT) USE OF MEDICATIONS: Primary | ICD-10-CM

## 2023-01-17 DIAGNOSIS — E11.9 CONTROLLED TYPE 2 DIABETES MELLITUS WITHOUT COMPLICATION, WITHOUT LONG-TERM CURRENT USE OF INSULIN (HCC): ICD-10-CM

## 2023-01-17 DIAGNOSIS — E83.42 HYPOMAGNESEMIA: ICD-10-CM

## 2023-01-17 DIAGNOSIS — E87.6 HYPOKALEMIA: ICD-10-CM

## 2023-01-17 DIAGNOSIS — I10 ESSENTIAL HYPERTENSION: ICD-10-CM

## 2023-01-17 DIAGNOSIS — E78.2 MIXED HYPERLIPIDEMIA: ICD-10-CM

## 2023-01-17 LAB
ANION GAP SERPL CALC-SCNC: 10 MMOL/L (ref 2–11)
BASOPHILS # BLD: 0.1 K/UL (ref 0–0.2)
BASOPHILS NFR BLD: 1 % (ref 0–2)
BUN SERPL-MCNC: 31 MG/DL (ref 6–23)
CALCIUM SERPL-MCNC: 11.2 MG/DL (ref 8.3–10.4)
CHLORIDE SERPL-SCNC: 103 MMOL/L (ref 101–110)
CHOLEST SERPL-MCNC: 249 MG/DL
CO2 SERPL-SCNC: 26 MMOL/L (ref 21–32)
CREAT SERPL-MCNC: 1.6 MG/DL (ref 0.6–1)
DIFFERENTIAL METHOD BLD: ABNORMAL
EOSINOPHIL # BLD: 0.6 K/UL (ref 0–0.8)
EOSINOPHIL NFR BLD: 7 % (ref 0.5–7.8)
ERYTHROCYTE [DISTWIDTH] IN BLOOD BY AUTOMATED COUNT: 12.5 % (ref 11.9–14.6)
EST. AVERAGE GLUCOSE BLD GHB EST-MCNC: 126 MG/DL
GLUCOSE SERPL-MCNC: 179 MG/DL (ref 65–100)
HBA1C MFR BLD: 6 % (ref 4.8–5.6)
HCT VFR BLD AUTO: 47.2 % (ref 35.8–46.3)
HDLC SERPL-MCNC: 38 MG/DL (ref 40–60)
HDLC SERPL: 6.6
HGB BLD-MCNC: 15.6 G/DL (ref 11.7–15.4)
IMM GRANULOCYTES # BLD AUTO: 0 K/UL (ref 0–0.5)
IMM GRANULOCYTES NFR BLD AUTO: 1 % (ref 0–5)
LDLC SERPL CALC-MCNC: 136.6 MG/DL
LYMPHOCYTES # BLD: 2.9 K/UL (ref 0.5–4.6)
LYMPHOCYTES NFR BLD: 34 % (ref 13–44)
MAGNESIUM SERPL-MCNC: 1.9 MG/DL (ref 1.8–2.4)
MCH RBC QN AUTO: 31.3 PG (ref 26.1–32.9)
MCHC RBC AUTO-ENTMCNC: 33.1 G/DL (ref 31.4–35)
MCV RBC AUTO: 94.8 FL (ref 82–102)
MONOCYTES # BLD: 0.6 K/UL (ref 0.1–1.3)
MONOCYTES NFR BLD: 7 % (ref 4–12)
NEUTS SEG # BLD: 4.3 K/UL (ref 1.7–8.2)
NEUTS SEG NFR BLD: 50 % (ref 43–78)
NRBC # BLD: 0 K/UL (ref 0–0.2)
PLATELET # BLD AUTO: 322 K/UL (ref 150–450)
PMV BLD AUTO: 10.8 FL (ref 9.4–12.3)
POTASSIUM SERPL-SCNC: 3.9 MMOL/L (ref 3.5–5.1)
RBC # BLD AUTO: 4.98 M/UL (ref 4.05–5.2)
SODIUM SERPL-SCNC: 139 MMOL/L (ref 133–143)
TRIGL SERPL-MCNC: 372 MG/DL (ref 35–150)
TSH W FREE THYROID IF ABNORMAL: 2.5 UIU/ML (ref 0.36–3.74)
VLDLC SERPL CALC-MCNC: 74.4 MG/DL (ref 6–23)
WBC # BLD AUTO: 8.5 K/UL (ref 4.3–11.1)

## 2023-01-19 ENCOUNTER — TELEPHONE (OUTPATIENT)
Dept: INTERNAL MEDICINE CLINIC | Facility: CLINIC | Age: 48
End: 2023-01-19

## 2023-01-19 NOTE — TELEPHONE ENCOUNTER
----- Message from Rula Peterson MD sent at 1/19/2023  9:43 AM EST -----  Diabetes control is great. Cholesterol is higher compared to  a few months ago. Make sure you are taking your cholesterol medication everyday. Recommend a low fat high fiber diet and regular exercise to help lower your cholesterol. Limit fried foods, red meats and animal fats. Eat more whole grains, fruits and vegetables.

## 2023-02-16 ENCOUNTER — OFFICE VISIT (OUTPATIENT)
Dept: OBGYN CLINIC | Age: 48
End: 2023-02-16

## 2023-02-16 VITALS — BODY MASS INDEX: 38.87 KG/M2 | WEIGHT: 233.6 LBS

## 2023-02-16 DIAGNOSIS — Z12.31 SCREENING MAMMOGRAM FOR BREAST CANCER: ICD-10-CM

## 2023-02-16 DIAGNOSIS — Z13.89 SCREENING FOR GENITOURINARY CONDITION: ICD-10-CM

## 2023-02-16 DIAGNOSIS — Z01.419 WELL WOMAN EXAM: Primary | ICD-10-CM

## 2023-02-16 LAB
BILIRUBIN, URINE, POC: NEGATIVE
BLOOD URINE, POC: NEGATIVE
GLUCOSE URINE, POC: 500
KETONES, URINE, POC: NEGATIVE
LEUKOCYTE ESTERASE, URINE, POC: NEGATIVE
NITRITE, URINE, POC: NEGATIVE
PH, URINE, POC: 5.5 (ref 4.6–8)
PROTEIN,URINE, POC: NEGATIVE
SPECIFIC GRAVITY, URINE, POC: 1 (ref 1–1.03)
URINALYSIS CLARITY, POC: CLEAR
URINALYSIS COLOR, POC: YELLOW
UROBILINOGEN, POC: NORMAL

## 2023-02-16 RX ORDER — ACETAMINOPHEN AND CODEINE PHOSPHATE 120; 12 MG/5ML; MG/5ML
0.35 SOLUTION ORAL DAILY
Qty: 28 TABLET | Refills: 11 | Status: SHIPPED | OUTPATIENT
Start: 2023-02-16

## 2023-02-16 NOTE — PROGRESS NOTES
2/16/2023 Sunday TITUS Hawkins  1975  Age: 49 y.o. Patient's last menstrual period was 06/14/2022 (approximate). [id number]  PCP: Izabela Fung MD  Patient does see them for regular preventative visits. HPI: No specific gyn concerns today. Last pap smear: 1/27/2021 Negative, HPV negative   Mammogram 3/2/2022 Negative     Complains of rare intermittent left lower pelvic pain. Also continues to have some leakage from vagina. No flowsheet data found. Allergies, medications, past medical and surgical history, social history, family history all reviewed. Review of Systems  Constitutional:  Denies unexplained weight loss or heat or cold intolerance  ENT: Denies change in vision, change in hearing, frequent headaches  Cardiovascular:  Denies chest pain, swelling in legs or feet, shortness of breath when lying flat  Respiratory:  Denies shortness of breath, cough greater than 2 weeks or coughing up blood  Gastro: Denies diarrhea greater than 2 weeks, rectal bleeding, bloody stools, heartburn, or constipation  :  Denies blood in urine, nocturia, dysuria or incontinence  Breast:  Denies nipple discharge, masses or pain  Skin:  Denies rash greater than 2 weeks, change in moles  Musculoskeletal/Neuro:  Denies joint pain, muscle weakness, seizures, loss of balance or frequent falls  Psych:  Denies frequent crying spells or severe anxiety  Heme:  Denies easy bruising, bleeding gums, frequent nosebleeds or swollen lymph nodes  GYN:  Denies bleeding or spotting between menses, heavy menses, menses longer than 7 days, pain with sex, severe menstrual cramps. Vitals:    02/16/23 0846   Weight: 233 lb 9.6 oz (106 kg)       Body mass index is 38.87 kg/m². Pt in no distress. Alert and oriented x3. Affect bright. Well developed, well nourished. HEENT: normocephalic, atraumatic. Sclerae nonicteric. Neck supple w/o thyromegaly. Trachea midline.   Heart: regular rate and rhythm, no murmur or gallop  Lungs: clear to auscultation. Respiratory effort normal.  Breasts: no masses or axillary lymphadenopathy  Abdomen: soft and nontender, no masses, no organomegaly, no ventral hernia  Pelvic: normal external genitalia, urethral meatus, urethra, vagina and cervix. Bimanual exam w/o masses or tenderness. Bladder nontender. Uterus normal size. Adnexae normal.  Perineum and anus normal. No hemorrhoids. Rectovaginal: no masses. Hemocult negative. Patient Active Problem List   Diagnosis    Controlled type 2 diabetes mellitus without complication, without long-term current use of insulin ([address]    Mixed hyperlipidemia    Type 2 diabetes mellitus with chronic kidney disease (HCC)    Hyperlipidemia    CKD (chronic kidney disease) stage 3, GFR 30-59 ml/min (HCC)    Nocturnal hypoxemia    Anemia    Essential hypertension    Elevated liver enzymes    Subcutaneous mass    Renal stone    Obesity, morbid (HCC)    Hypersomnia    Migraine without aura and without status migrainosus, not intractable    Vitamin D deficiency    Acute renal failure (ARF) (HCC)    KENRICK (obstructive sleep apnea)    Diabetes ([address]          Orders Placed This Encounter   Procedures    ESTEFANÍA PACO DIGITAL SCREEN BILATERAL    AMB POC URINALYSIS DIP STICK AUTO W/O MICRO          Pt counseled about routine screening recommendations, continued Micronor and etiology of dampness from vaginal area. She does not think it could be urine. Normal exam today.        Jomar Friedman MD

## 2023-02-16 NOTE — PROGRESS NOTES
Last pap smear: 1/27/2021 Negative, HPV negative   Mammogram 3/2/2022 Negative     Complains of intermittent left lower pelvic pain. Also continues to have some urinary leakage.     Has not had a period since June 2022

## 2023-03-12 RX ORDER — POTASSIUM CHLORIDE 750 MG/1
CAPSULE, EXTENDED RELEASE ORAL
Qty: 60 CAPSULE | Refills: 3 | Status: CANCELLED | OUTPATIENT
Start: 2023-03-12

## 2023-03-13 ENCOUNTER — OFFICE VISIT (OUTPATIENT)
Dept: INTERNAL MEDICINE CLINIC | Facility: CLINIC | Age: 48
End: 2023-03-13
Payer: COMMERCIAL

## 2023-03-13 ENCOUNTER — TELEPHONE (OUTPATIENT)
Dept: INTERNAL MEDICINE CLINIC | Facility: CLINIC | Age: 48
End: 2023-03-13

## 2023-03-13 VITALS
TEMPERATURE: 97.2 F | HEIGHT: 65 IN | DIASTOLIC BLOOD PRESSURE: 86 MMHG | BODY MASS INDEX: 38.99 KG/M2 | SYSTOLIC BLOOD PRESSURE: 123 MMHG | HEART RATE: 106 BPM | OXYGEN SATURATION: 94 % | RESPIRATION RATE: 18 BRPM | WEIGHT: 234 LBS

## 2023-03-13 DIAGNOSIS — E53.8 VITAMIN B 12 DEFICIENCY: ICD-10-CM

## 2023-03-13 DIAGNOSIS — F34.1 PERSISTENT DEPRESSIVE DISORDER: Primary | ICD-10-CM

## 2023-03-13 DIAGNOSIS — E55.9 VITAMIN D DEFICIENCY: ICD-10-CM

## 2023-03-13 LAB — 25(OH)D3 SERPL-MCNC: 39.2 NG/ML (ref 30–100)

## 2023-03-13 PROCEDURE — 3079F DIAST BP 80-89 MM HG: CPT | Performed by: NURSE PRACTITIONER

## 2023-03-13 PROCEDURE — 99214 OFFICE O/P EST MOD 30 MIN: CPT | Performed by: NURSE PRACTITIONER

## 2023-03-13 PROCEDURE — G8427 DOCREV CUR MEDS BY ELIG CLIN: HCPCS | Performed by: NURSE PRACTITIONER

## 2023-03-13 PROCEDURE — G8417 CALC BMI ABV UP PARAM F/U: HCPCS | Performed by: NURSE PRACTITIONER

## 2023-03-13 PROCEDURE — G8482 FLU IMMUNIZE ORDER/ADMIN: HCPCS | Performed by: NURSE PRACTITIONER

## 2023-03-13 PROCEDURE — 3074F SYST BP LT 130 MM HG: CPT | Performed by: NURSE PRACTITIONER

## 2023-03-13 PROCEDURE — 1036F TOBACCO NON-USER: CPT | Performed by: NURSE PRACTITIONER

## 2023-03-13 RX ORDER — POTASSIUM CHLORIDE 750 MG/1
CAPSULE, EXTENDED RELEASE ORAL
Qty: 60 CAPSULE | Refills: 5 | Status: SHIPPED | OUTPATIENT
Start: 2023-03-13

## 2023-03-13 RX ORDER — TRAZODONE HYDROCHLORIDE 50 MG/1
50 TABLET ORAL NIGHTLY
Qty: 30 TABLET | Refills: 3 | Status: SHIPPED | OUTPATIENT
Start: 2023-03-13

## 2023-03-13 SDOH — ECONOMIC STABILITY: HOUSING INSECURITY
IN THE LAST 12 MONTHS, WAS THERE A TIME WHEN YOU DID NOT HAVE A STEADY PLACE TO SLEEP OR SLEPT IN A SHELTER (INCLUDING NOW)?: NO

## 2023-03-13 SDOH — ECONOMIC STABILITY: FOOD INSECURITY: WITHIN THE PAST 12 MONTHS, THE FOOD YOU BOUGHT JUST DIDN'T LAST AND YOU DIDN'T HAVE MONEY TO GET MORE.: NEVER TRUE

## 2023-03-13 SDOH — ECONOMIC STABILITY: FOOD INSECURITY: WITHIN THE PAST 12 MONTHS, YOU WORRIED THAT YOUR FOOD WOULD RUN OUT BEFORE YOU GOT MONEY TO BUY MORE.: SOMETIMES TRUE

## 2023-03-13 SDOH — ECONOMIC STABILITY: INCOME INSECURITY: HOW HARD IS IT FOR YOU TO PAY FOR THE VERY BASICS LIKE FOOD, HOUSING, MEDICAL CARE, AND HEATING?: SOMEWHAT HARD

## 2023-03-13 ASSESSMENT — PATIENT HEALTH QUESTIONNAIRE - PHQ9
10. IF YOU CHECKED OFF ANY PROBLEMS, HOW DIFFICULT HAVE THESE PROBLEMS MADE IT FOR YOU TO DO YOUR WORK, TAKE CARE OF THINGS AT HOME, OR GET ALONG WITH OTHER PEOPLE: 2
5. POOR APPETITE OR OVEREATING: 2
9. THOUGHTS THAT YOU WOULD BE BETTER OFF DEAD, OR OF HURTING YOURSELF: 0
6. FEELING BAD ABOUT YOURSELF - OR THAT YOU ARE A FAILURE OR HAVE LET YOURSELF OR YOUR FAMILY DOWN: 3
SUM OF ALL RESPONSES TO PHQ9 QUESTIONS 1 & 2: 6
4. FEELING TIRED OR HAVING LITTLE ENERGY: 3
1. LITTLE INTEREST OR PLEASURE IN DOING THINGS: 3
3. TROUBLE FALLING OR STAYING ASLEEP: 2
7. TROUBLE CONCENTRATING ON THINGS, SUCH AS READING THE NEWSPAPER OR WATCHING TELEVISION: 3
2. FEELING DOWN, DEPRESSED OR HOPELESS: 3
SUM OF ALL RESPONSES TO PHQ QUESTIONS 1-9: 22
SUM OF ALL RESPONSES TO PHQ QUESTIONS 1-9: 22
8. MOVING OR SPEAKING SO SLOWLY THAT OTHER PEOPLE COULD HAVE NOTICED. OR THE OPPOSITE, BEING SO FIGETY OR RESTLESS THAT YOU HAVE BEEN MOVING AROUND A LOT MORE THAN USUAL: 3
SUM OF ALL RESPONSES TO PHQ QUESTIONS 1-9: 22
SUM OF ALL RESPONSES TO PHQ QUESTIONS 1-9: 22

## 2023-03-13 ASSESSMENT — ENCOUNTER SYMPTOMS: SHORTNESS OF BREATH: 0

## 2023-03-13 NOTE — PROGRESS NOTES
3/13/2023 11:12 AM  Location:[facility] [address] [facility] MEDICINE  [place]  Patient #:  [id number]  YOB: 1975          YOUR LAST HEMOGLOBIN A1CS:   No results found for: HBA1C, [id number]    YOUR LAST LIPID PROFILE:   Lab Results   Component Value Date/Time    CHOL 249 01/17/2023 08:16 AM    HDL 38 01/17/2023 08:16 AM    VLDL 43 03/25/2022 09:01 AM         Lab Results   Component Value Date/Time    GFRAA 52 09/09/2022 09:17 AM    BUN 31 01/17/2023 08:16 AM     01/17/2023 08:16 AM    K 3.9 01/17/2023 08:16 AM     01/17/2023 08:16 AM    CO2 26 01/17/2023 08:16 AM           History of Present Illness     Chief Complaint   Patient presents with    Depression     Crying and menopause        Ms. Sridevi Ramey is a 49 y.o. female  who presents for depression. Ms. Sridevi Ramey presents for persistent depressive symptoms. She has a history of depression as well as early menopause, has been on oral estrogen and followed by gynecology. She has been taking Cymbalta at 150 mg daily as well as Ativan on an as-needed basis for some time. She lost her daughter 2 years ago and went back to work 2 weeks after her death. Feels like she never really dealt with her death. She has also recently had some financial burdens. She does have a behavioral health counselor she has been talking to but reports last week she had an episode of crying. She states that she could not stop crying all day. She denies associated suicidal or homicidal ideations, auditory or visual hallucinations. She is eating sometimes not enough and sometimes too much. She is not sleeping well and reports that she maybe gets about 30 minutes of sleep and night. The Ativan has made her too sleepy during the day so she has not been taking.          Allergies   Allergen Reactions    Amoxicillin Other (See Comments)     Itching     Oxycodone-Acetaminophen Itching     Past Medical History:   Diagnosis Date    MIRNA (acute kidney injury) [facility])     saw nephrologist 2/26/14. [place] due to volume depletion, meds. Meds adjusted, now f/u with PCP    Diabetes ([facility] [address] 2011    [place], avgfbs 115, last HA1C 6.6, does not experience hypo    Gall bladder disease     GERD (gastroesophageal reflux disease)     controlled with protonix    Hypercholesterolemia     Hypertension     well controlled by medication    Morbid obesity ([facility] [address]     BMI 41.7    Psychiatric disorder     depression , controlled by cymbalta    Sleep apnea      Social History     Socioeconomic History    Marital status:      Spouse name: None    Number of children: None    Years of education: None    Highest education level: None   Tobacco Use    Smoking status: Never    Smokeless tobacco: Never   Substance and Sexual Activity    Alcohol use:  Yes     Alcohol/week: 0.0 standard drinks    Drug use: No     Social Determinants of Health     Financial Resource Strain: Medium Risk    Difficulty of Paying Living Expenses: Somewhat hard   Food Insecurity: Food Insecurity Present    Worried About Running Out of Food in the Last Year: Sometimes true    Ran Out of Food in the Last Year: Never true   Transportation Needs: Unknown    Lack of Transportation (Non-Medical): No   Housing Stability: Unknown    Unstable Housing in the Last Year: No     Past Surgical History:   Procedure Laterality Date    CHOLECYSTECTOMY      COLONOSCOPY      NEPHROSTOMY  08/01/2019    Dr. Thomson Repress       Current Outpatient Medications   Medication Sig Dispense Refill    norethindrone (MICRONOR) 0.35 MG tablet Take 1 tablet by mouth daily 28 tablet 11    metoclopramide (REGLAN) 10 MG tablet Take 10 mg by mouth in the morning, at noon, and at bedtime      losartan-hydroCHLOROthiazide (HYZAAR) 100-12.5 MG per tablet Take 1 tablet by mouth daily TAKE 1 TABLET BY MOUTH EVERY DAY 30 tablet 5    DULoxetine (CYMBALTA) 30 MG extended release capsule 1 po qd with the 60 mg capsules 30 capsule 5    empagliflozin (JARDIANCE) 25 MG tablet Take 1 tablet by mouth daily TAKE 1 TABLET BY MOUTH EVERY MORNING FOR DIABETES 30 tablet 5    DULoxetine (CYMBALTA) 60 MG extended release capsule 2 po qd for mood Indications: major depressive disorder 60 capsule 5    atorvastatin (LIPITOR) 40 MG tablet TAKE 1 TABLET BY MOUTH EVERY DAY 30 tablet 11    LORazepam (ATIVAN) 1 MG tablet Take 1 tablet by mouth 2 times daily. Take 1/2 to one tablet twice a day as needed Indications: anxious 60 tablet 5    Semaglutide, 2 MG/DOSE, (OZEMPIC, 2 MG/DOSE,) 8 MG/3ML SOPN Inject 2 mg into the skin every 7 days 3 mL 5    glimepiride (AMARYL) 4 MG tablet TAKE 1 TABLET BY MOUTH TWICE DAILY WITH MEALS 60 tablet 5    omeprazole (PRILOSEC) 10 MG delayed release capsule Take 1 capsule by mouth daily 30 capsule 5    Ivermectin 1 % CREA APPLY TOPICALLY TO CLEAN DRY FACE TWICE DAILY FOR 2 WEEKS THEN AS NEEDED FLARES      triamcinolone (KENALOG) 0.1 % cream APPLY TO AFFECTED RASH ON ARM TWICE DAILY FOR 14 DAYS THEN STOP.  DO NOT USE ON THE FACE      APPLE CIDER VINEGAR PO Take by mouth      ascorbic acid (VITAMIN C) 250 MG tablet Take by mouth      aspirin 81 MG EC tablet Take by mouth daily      Biotin 2.5 MG CAPS Take by mouth      vitamin D (CHOLECALCIFEROL) 25 MCG (1000 UT) TABS tablet Take by mouth daily      ferrous gluconate (FERGON) 240 (27 Fe) MG tablet Take 240 mg by mouth 3 times daily (with meals)      fluticasone (FLONASE) 50 MCG/ACT nasal spray 2 sprays by Nasal route daily      levocetirizine (XYZAL) 5 MG tablet Take 5 mg by mouth daily      ondansetron (ZOFRAN-ODT) 4 MG disintegrating tablet Take 4 mg by mouth every 8 hours as needed      SUMAtriptan (IMITREX) 100 MG tablet TAKE 1/2 TO 1 TABLET BY MOUTH AT ONSET OF MIGRAINE MAY REPEAT IN 2 HOURS IF NEEDED      potassium chloride (MICRO-K) 10 MEQ extended release capsule TAKE 1 CAPSULE BY MOUTH TWICE DAILY (Patient not taking: Reported on 3/13/2023) 60 capsule 3    MAGNESIUM-OXIDE 400 (240 Mg) MG tablet TAKE 1 TABLET BY MOUTH DAILY (Patient not taking: Reported on 3/13/2023) 30 tablet 11    Continuous Blood Gluc Transmit (DEXCOM G6 TRANSMITTER) MISC Check glucose daily (Patient not taking: Reported on 3/13/2023) 10 each 5    Continuous Blood Gluc Sensor (DEXCOM G6 SENSOR) MISC Check glucose daily (Patient not taking: Reported on 3/13/2023) 10 each 5    Continuous Blood Gluc  (DEXCOM G6 ) MARGARETH Check glucose daily (Patient not taking: Reported on 3/13/2023) 1 each 0     No current facility-administered medications for this visit. Health Maintenance   Topic Date Due    HIV screen  Never done    Hepatitis C screen  Never done    Hepatitis B vaccine (1 of 3 - Risk 3-dose series) Never done    Pneumococcal 0-64 years Vaccine (2 - PCV) 11/28/2018    Diabetic retinal exam  05/19/2019    Colorectal Cancer Screen  Never done    COVID-19 Vaccine (4 - Booster for Moderna series) 03/11/2022    Diabetic Alb to Cr ratio (uACR) test  03/28/2023    Diabetic foot exam  06/10/2023    Depression Monitoring  01/10/2024    A1C test (Diabetic or Prediabetic)  01/17/2024    Lipids  01/17/2024    GFR test (Diabetes, CKD 3-4, OR last GFR 15-59)  01/17/2024    DTaP/Tdap/Td vaccine (2 - Td or Tdap) 07/14/2025    Cervical cancer screen  01/27/2026    Flu vaccine  Completed    Hepatitis A vaccine  Aged Out    Hib vaccine  Aged Out    Meningococcal (ACWY) vaccine  Aged Out     Family History   Adopted: Yes   Problem Relation Age of Onset    Breast Cancer Neg Hx              Review of Systems  Review of Systems   Constitutional:  Negative for appetite change, fever and unexpected weight change. Respiratory:  Negative for shortness of breath. Cardiovascular:  Negative for chest pain, palpitations and leg swelling. Psychiatric/Behavioral:  Positive for decreased concentration, dysphoric mood and sleep disturbance. Negative for hallucinations, self-injury and suicidal ideas. The patient is nervous/anxious.  The patient is not hyperactive. /86 (Site: Right Lower Arm, Position: Sitting, Cuff Size: Large Adult)   Pulse (!) 106   Temp 97.2 °F (36.2 °C) (Temporal)   Resp 18   Ht 5' 5\" (1.651 m)   Wt 234 lb (106.1 kg)   SpO2 94% Comment: ra  BMI 38.94 kg/m²       Physical Exam    Physical Exam  Vitals and nursing note reviewed. Constitutional:       General: She is not in acute distress. Appearance: She is not ill-appearing, toxic-appearing or diaphoretic. HENT:      Mouth/Throat:      Mouth: Mucous membranes are moist.   Cardiovascular:      Rate and Rhythm: Regular rhythm. Tachycardia present. Comments: Rate 105-115  Pulmonary:      Effort: No respiratory distress. Breath sounds: No wheezing, rhonchi or rales. Musculoskeletal:      Right lower leg: No edema. Left lower leg: No edema. Neurological:      Mental Status: She is oriented to person, place, and time. Psychiatric:         Attention and Perception: Attention normal.         Mood and Affect: Affect is tearful. Speech: Speech normal.         Behavior: Behavior normal.         Thought Content: Thought content normal. Thought content does not include homicidal or suicidal ideation.          Cognition and Memory: Cognition normal.         Judgment: Judgment normal.       Assessment & Plan      Current Outpatient Medications   Medication Sig Dispense Refill    norethindrone (MICRONOR) 0.35 MG tablet Take 1 tablet by mouth daily 28 tablet 11    metoclopramide (REGLAN) 10 MG tablet Take 10 mg by mouth in the morning, at noon, and at bedtime      losartan-hydroCHLOROthiazide (HYZAAR) 100-12.5 MG per tablet Take 1 tablet by mouth daily TAKE 1 TABLET BY MOUTH EVERY DAY 30 tablet 5    DULoxetine (CYMBALTA) 30 MG extended release capsule 1 po qd with the 60 mg capsules 30 capsule 5    empagliflozin (JARDIANCE) 25 MG tablet Take 1 tablet by mouth daily TAKE 1 TABLET BY MOUTH EVERY MORNING FOR DIABETES 30 tablet 5    DULoxetine (CYMBALTA) 60 MG extended release capsule 2 po qd for mood Indications: major depressive disorder 60 capsule 5    atorvastatin (LIPITOR) 40 MG tablet TAKE 1 TABLET BY MOUTH EVERY DAY 30 tablet 11    LORazepam (ATIVAN) 1 MG tablet Take 1 tablet by mouth 2 times daily. Take 1/2 to one tablet twice a day as needed Indications: anxious 60 tablet 5    Semaglutide, 2 MG/DOSE, (OZEMPIC, 2 MG/DOSE,) 8 MG/3ML SOPN Inject 2 mg into the skin every 7 days 3 mL 5    glimepiride (AMARYL) 4 MG tablet TAKE 1 TABLET BY MOUTH TWICE DAILY WITH MEALS 60 tablet 5    omeprazole (PRILOSEC) 10 MG delayed release capsule Take 1 capsule by mouth daily 30 capsule 5    Ivermectin 1 % CREA APPLY TOPICALLY TO CLEAN DRY FACE TWICE DAILY FOR 2 WEEKS THEN AS NEEDED FLARES      triamcinolone (KENALOG) 0.1 % cream APPLY TO AFFECTED RASH ON ARM TWICE DAILY FOR 14 DAYS THEN STOP.  DO NOT USE ON THE FACE      APPLE CIDER VINEGAR PO Take by mouth      ascorbic acid (VITAMIN C) 250 MG tablet Take by mouth      aspirin 81 MG EC tablet Take by mouth daily      Biotin 2.5 MG CAPS Take by mouth      vitamin D (CHOLECALCIFEROL) 25 MCG (1000 UT) TABS tablet Take by mouth daily      ferrous gluconate (FERGON) 240 (27 Fe) MG tablet Take 240 mg by mouth 3 times daily (with meals)      fluticasone (FLONASE) 50 MCG/ACT nasal spray 2 sprays by Nasal route daily      levocetirizine (XYZAL) 5 MG tablet Take 5 mg by mouth daily      ondansetron (ZOFRAN-ODT) 4 MG disintegrating tablet Take 4 mg by mouth every 8 hours as needed      SUMAtriptan (IMITREX) 100 MG tablet TAKE 1/2 TO 1 TABLET BY MOUTH AT ONSET OF MIGRAINE MAY REPEAT IN 2 HOURS IF NEEDED      potassium chloride (MICRO-K) 10 MEQ extended release capsule TAKE 1 CAPSULE BY MOUTH TWICE DAILY (Patient not taking: Reported on 3/13/2023) 60 capsule 3    MAGNESIUM-OXIDE 400 (240 Mg) MG tablet TAKE 1 TABLET BY MOUTH DAILY (Patient not taking: Reported on 3/13/2023) 30 tablet 11    Continuous Blood Gluc Transmit (DEXCOM G6 TRANSMITTER) MISC Check glucose daily (Patient not taking: Reported on 3/13/2023) 10 each 5    Continuous Blood Gluc Sensor (DEXCOM G6 SENSOR) MISC Check glucose daily (Patient not taking: Reported on 3/13/2023) 10 each 5    Continuous Blood Gluc  (DEXCOM G6 ) MARGARETH Check glucose daily (Patient not taking: Reported on 3/13/2023) 1 each 0     No current facility-administered medications for this visit. 1. Persistent depressive disorder  We had an in-depth discussion about her symptoms. Currently she feels that her insomnia may be precipitating her increased depressive symptoms. We will add low-dose trazodone. If this is not helpful, consider stopping this and adding Lexapro in the morning and have her utilize her Ativan in the evening. I will reach out to her on my chart to make sure she is responding to current therapy. Has upcoming follow-up with Dr. Argenis Wood. Referred her on for psychiatry and urged her to reach out to her behavioral health therapist who she has a trusted relationship with. Today she is not suicidal or homicidal, and reports no hallucinations. Recent labs reviewed, normal thyroid done in January. We note her tachycardia, she reports no chest pain, palpitations or shortness of breath and states this is her norm. We could also consider adding a low-dose beta-blocker like Inderal if needed for anxiety component. For now, we will begin with trazodone and close follow-up. Knows to call for any new or worsening symptoms. - traZODone (DESYREL) 50 MG tablet; Take 1 tablet by mouth nightly  Dispense: 30 tablet; Refill: 3  - External Referral to Psychiatry    2. Vitamin D deficiency  - Vitamin D 25 Hydroxy; Future  - Vitamin D 25 Hydroxy    3. Vitamin B 12 deficiency  - Vitamin B12; Future  - Vitamin B12  No follow-up provider specified.         Brandon Durand, APRN - CNP

## 2023-03-14 LAB — VIT B12 SERPL-MCNC: 527 PG/ML (ref 193–986)

## 2023-03-28 ENCOUNTER — TELEPHONE (OUTPATIENT)
Dept: INTERNAL MEDICINE CLINIC | Facility: CLINIC | Age: 48
End: 2023-03-28

## 2023-03-28 NOTE — TELEPHONE ENCOUNTER
I sent the message below via my chart, patient has not read the following message, please relay. Thanks! Ms. Alpesh Roe,  Your Vitmin d and B levels are normal.   Please let me know if you don't get some relief after adding the trazodone. Make sure you follow up with your counselor. You should also be hearing from Dr. Gage Gonzales practice about an appointment for follow up. We are here if you need us!   [place] , [address] [facility]

## 2023-03-28 NOTE — TELEPHONE ENCOUNTER
Patient Notified  Patient states [facility]. She has a second visit coming up.

## 2023-04-25 ENCOUNTER — OFFICE VISIT (OUTPATIENT)
Dept: INTERNAL MEDICINE CLINIC | Facility: CLINIC | Age: 48
End: 2023-04-25
Payer: COMMERCIAL

## 2023-04-25 VITALS
BODY MASS INDEX: 38.15 KG/M2 | OXYGEN SATURATION: 97 % | HEART RATE: 105 BPM | DIASTOLIC BLOOD PRESSURE: 62 MMHG | HEIGHT: 65 IN | RESPIRATION RATE: 18 BRPM | WEIGHT: 229 LBS | TEMPERATURE: 97.7 F | SYSTOLIC BLOOD PRESSURE: 88 MMHG

## 2023-04-25 DIAGNOSIS — E11.22 TYPE 2 DIABETES MELLITUS WITH STAGE 3B CHRONIC KIDNEY DISEASE, WITHOUT LONG-TERM CURRENT USE OF INSULIN (HCC): Primary | ICD-10-CM

## 2023-04-25 DIAGNOSIS — N18.32 STAGE 3B CHRONIC KIDNEY DISEASE (HCC): ICD-10-CM

## 2023-04-25 DIAGNOSIS — L72.9 SUBCUTANEOUS CYST: ICD-10-CM

## 2023-04-25 DIAGNOSIS — R22.41 SUBCUTANEOUS NODULE OF RIGHT LOWER LEG: ICD-10-CM

## 2023-04-25 DIAGNOSIS — N18.32 TYPE 2 DIABETES MELLITUS WITH STAGE 3B CHRONIC KIDNEY DISEASE, WITHOUT LONG-TERM CURRENT USE OF INSULIN (HCC): Primary | ICD-10-CM

## 2023-04-25 DIAGNOSIS — M79.5 FOREIGN BODY (FB) IN SOFT TISSUE: ICD-10-CM

## 2023-04-25 PROCEDURE — 1036F TOBACCO NON-USER: CPT | Performed by: INTERNAL MEDICINE

## 2023-04-25 PROCEDURE — G8417 CALC BMI ABV UP PARAM F/U: HCPCS | Performed by: INTERNAL MEDICINE

## 2023-04-25 PROCEDURE — 3078F DIAST BP <80 MM HG: CPT | Performed by: INTERNAL MEDICINE

## 2023-04-25 PROCEDURE — 10120 INC&RMVL FB SUBQ TISS SMPL: CPT | Performed by: INTERNAL MEDICINE

## 2023-04-25 PROCEDURE — 3044F HG A1C LEVEL LT 7.0%: CPT | Performed by: INTERNAL MEDICINE

## 2023-04-25 PROCEDURE — 3074F SYST BP LT 130 MM HG: CPT | Performed by: INTERNAL MEDICINE

## 2023-04-25 PROCEDURE — 2022F DILAT RTA XM EVC RTNOPTHY: CPT | Performed by: INTERNAL MEDICINE

## 2023-04-25 PROCEDURE — 20520 RMVL FB MUSC/TDN SIMPLE: CPT | Performed by: INTERNAL MEDICINE

## 2023-04-25 PROCEDURE — 99214 OFFICE O/P EST MOD 30 MIN: CPT | Performed by: INTERNAL MEDICINE

## 2023-04-25 PROCEDURE — G8427 DOCREV CUR MEDS BY ELIG CLIN: HCPCS | Performed by: INTERNAL MEDICINE

## 2023-04-25 RX ORDER — CEPHALEXIN 250 MG/1
250 CAPSULE ORAL 3 TIMES DAILY
Qty: 21 CAPSULE | Refills: 0 | Status: SHIPPED | OUTPATIENT
Start: 2023-04-25 | End: 2023-05-02

## 2023-04-25 RX ORDER — OMEPRAZOLE 10 MG/1
10 CAPSULE, DELAYED RELEASE ORAL DAILY
Qty: 30 CAPSULE | Refills: 5 | Status: SHIPPED | OUTPATIENT
Start: 2023-04-25

## 2023-04-25 RX ORDER — BREXPIPRAZOLE 1 MG/1
1 TABLET ORAL DAILY
COMMUNITY
Start: 2023-04-11

## 2023-04-25 ASSESSMENT — PATIENT HEALTH QUESTIONNAIRE - PHQ9
SUM OF ALL RESPONSES TO PHQ QUESTIONS 1-9: 13
4. FEELING TIRED OR HAVING LITTLE ENERGY: 3
SUM OF ALL RESPONSES TO PHQ QUESTIONS 1-9: 13
6. FEELING BAD ABOUT YOURSELF - OR THAT YOU ARE A FAILURE OR HAVE LET YOURSELF OR YOUR FAMILY DOWN: 2
3. TROUBLE FALLING OR STAYING ASLEEP: 1
SUM OF ALL RESPONSES TO PHQ9 QUESTIONS 1 & 2: 4
10. IF YOU CHECKED OFF ANY PROBLEMS, HOW DIFFICULT HAVE THESE PROBLEMS MADE IT FOR YOU TO DO YOUR WORK, TAKE CARE OF THINGS AT HOME, OR GET ALONG WITH OTHER PEOPLE: 1
8. MOVING OR SPEAKING SO SLOWLY THAT OTHER PEOPLE COULD HAVE NOTICED. OR THE OPPOSITE, BEING SO FIGETY OR RESTLESS THAT YOU HAVE BEEN MOVING AROUND A LOT MORE THAN USUAL: 0
5. POOR APPETITE OR OVEREATING: 2
SUM OF ALL RESPONSES TO PHQ QUESTIONS 1-9: 13
9. THOUGHTS THAT YOU WOULD BE BETTER OFF DEAD, OR OF HURTING YOURSELF: 0
2. FEELING DOWN, DEPRESSED OR HOPELESS: 2
7. TROUBLE CONCENTRATING ON THINGS, SUCH AS READING THE NEWSPAPER OR WATCHING TELEVISION: 1
1. LITTLE INTEREST OR PLEASURE IN DOING THINGS: 2
SUM OF ALL RESPONSES TO PHQ QUESTIONS 1-9: 13

## 2023-04-25 NOTE — PROGRESS NOTES
3b chronic kidney disease, without long-term current use of insulin (HCC)     Stage 3b chronic kidney disease ([facility] [address]        Orders Placed This Encounter   Medications    omeprazole (PRILOSEC) 10 MG delayed release capsule     Sig: Take 1 capsule by mouth daily     Dispense:  30 capsule     Refill:  5    cephALEXin (KEFLEX) 250 MG capsule     Sig: Take 1 capsule by mouth 3 times daily for 7 days     Dispense:  21 capsule     Refill:  0       Orders Placed This Encounter   Procedures    [facility]ology     Standing Status:   Future     Number of Occurrences:   1     Standing Expiration Date:   4/25/2024    [facility] Surgical Pathology     ATLASORDERNUMBER:[id number]:[facility] [facility] PATH     Nodule Excision/Foreign body  Informed Consent Obtained  Site prepped and draped sterile fashion. Lidocaine with epi 3ml use to anesthetize site. Using #11 blade 2cm incision made over nodule and   Calcified hard nodule removed. Hemostasis was obtained. 3 suture with 4.0 used to close the incision. Dressed with sterile bandage. Patient tolerated procedure well with no immediate complications. Specimen submitted to path. Return in about 2 weeks (around 5/9/2023) for suture removal.    pathology    Date Obtained:   4/26/2023   DIAGNOSIS        \"RIGHT LEG\":  BENIGN FIBROADIPOSE TISSUE ASSOCIATED WITH A CALCIFIED   NODULE WHICH APPEARS ACELLULAR.        Dipti Mcdaniel MD

## 2023-04-29 ASSESSMENT — ENCOUNTER SYMPTOMS
SHORTNESS OF BREATH: 0
ABDOMINAL PAIN: 0
COUGH: 0

## 2023-04-29 NOTE — RESULT ENCOUNTER NOTE
Still not clear what I removed from your leg. It seems to be some sort of a calcified foreign body.     \"RIGHT LEG\":  BENIGN FIBROADIPOSE TISSUE ASSOCIATED WITH A CALCIFIED   NODULE WHICH APPEARS ACELLULAR.

## 2023-05-01 ENCOUNTER — TELEPHONE (OUTPATIENT)
Dept: INTERNAL MEDICINE CLINIC | Facility: CLINIC | Age: 48
End: 2023-05-01

## 2023-05-01 NOTE — TELEPHONE ENCOUNTER
----- Message from Charlette Capellan MD sent at 4/29/2023  5:06 PM EDT -----  Still not clear what I removed from your leg. It seems to be some sort of a calcified foreign body.     \"RIGHT LEG\":  BENIGN FIBROADIPOSE TISSUE ASSOCIATED WITH A CALCIFIED   NODULE WHICH APPEARS ACELLULAR.

## 2023-05-10 ENCOUNTER — HOSPITAL ENCOUNTER (OUTPATIENT)
Dept: MAMMOGRAPHY | Age: 48
Discharge: HOME | End: 2023-05-13
Payer: COMMERCIAL

## 2023-05-10 DIAGNOSIS — Z01.419 WELL WOMAN EXAM: ICD-10-CM

## 2023-05-10 DIAGNOSIS — Z12.31 SCREENING MAMMOGRAM FOR BREAST CANCER: ICD-10-CM

## 2023-05-10 PROCEDURE — 77063 BREAST TOMOSYNTHESIS BI: CPT

## 2023-05-11 ENCOUNTER — OFFICE VISIT (OUTPATIENT)
Dept: INTERNAL MEDICINE CLINIC | Facility: CLINIC | Age: 48
End: 2023-05-11
Payer: COMMERCIAL

## 2023-05-11 VITALS
WEIGHT: 231.4 LBS | DIASTOLIC BLOOD PRESSURE: 73 MMHG | BODY MASS INDEX: 38.55 KG/M2 | OXYGEN SATURATION: 97 % | HEART RATE: 96 BPM | HEIGHT: 65 IN | SYSTOLIC BLOOD PRESSURE: 106 MMHG | TEMPERATURE: 97.7 F | RESPIRATION RATE: 17 BRPM

## 2023-05-11 DIAGNOSIS — R20.0 LEFT ARM NUMBNESS: ICD-10-CM

## 2023-05-11 DIAGNOSIS — F51.01 PRIMARY INSOMNIA: ICD-10-CM

## 2023-05-11 DIAGNOSIS — F34.1 PERSISTENT DEPRESSIVE DISORDER: ICD-10-CM

## 2023-05-11 DIAGNOSIS — Z48.89 ENCOUNTER FOR POST SURGICAL WOUND CHECK: Primary | ICD-10-CM

## 2023-05-11 DIAGNOSIS — E11.649 TYPE 2 DIABETES MELLITUS WITH HYPOGLYCEMIA WITHOUT COMA, WITHOUT LONG-TERM CURRENT USE OF INSULIN (HCC): ICD-10-CM

## 2023-05-11 PROCEDURE — 2022F DILAT RTA XM EVC RTNOPTHY: CPT | Performed by: NURSE PRACTITIONER

## 2023-05-11 PROCEDURE — 1036F TOBACCO NON-USER: CPT | Performed by: NURSE PRACTITIONER

## 2023-05-11 PROCEDURE — 3078F DIAST BP <80 MM HG: CPT | Performed by: NURSE PRACTITIONER

## 2023-05-11 PROCEDURE — G8417 CALC BMI ABV UP PARAM F/U: HCPCS | Performed by: NURSE PRACTITIONER

## 2023-05-11 PROCEDURE — 3044F HG A1C LEVEL LT 7.0%: CPT | Performed by: NURSE PRACTITIONER

## 2023-05-11 PROCEDURE — 3074F SYST BP LT 130 MM HG: CPT | Performed by: NURSE PRACTITIONER

## 2023-05-11 PROCEDURE — G8427 DOCREV CUR MEDS BY ELIG CLIN: HCPCS | Performed by: NURSE PRACTITIONER

## 2023-05-11 PROCEDURE — 99213 OFFICE O/P EST LOW 20 MIN: CPT | Performed by: NURSE PRACTITIONER

## 2023-05-11 RX ORDER — GLIMEPIRIDE 4 MG/1
4 TABLET ORAL
Qty: 60 TABLET | Refills: 5
Start: 2023-05-11

## 2023-05-11 RX ORDER — TRAZODONE HYDROCHLORIDE 50 MG/1
50 TABLET ORAL NIGHTLY
Qty: 30 TABLET | Refills: 3 | Status: SHIPPED | OUTPATIENT
Start: 2023-05-11

## 2023-05-11 ASSESSMENT — ENCOUNTER SYMPTOMS: SHORTNESS OF BREATH: 0

## 2023-05-11 NOTE — PROGRESS NOTES
5/11/2023 2:17 PM  Location:[facility] [address] INTERNAL MEDICINE  [place]  Patient #:  [id number]  YOB: 1975          YOUR LAST HEMOGLOBIN A1CS:   No results found for: HBA1C, [id number]    YOUR LAST LIPID PROFILE:   Lab Results   Component Value Date/Time    CHOL 249 01/17/2023 08:16 AM    HDL 38 01/17/2023 08:16 AM    VLDL 43 03/25/2022 09:01 AM         Lab Results   Component Value Date/Time    GFRAA 52 09/09/2022 09:17 AM    BUN 31 01/17/2023 08:16 AM     01/17/2023 08:16 AM    K 3.9 01/17/2023 08:16 AM     01/17/2023 08:16 AM    CO2 26 01/17/2023 08:16 AM           History of Present Illness     Chief Complaint   Patient presents with    Wound Check     Suture removal. Right lower leg       Ms. Kd Balbuena is a 49 y.o. female  who presents for wound check, diabetes. Ms. Kd Balbuena presents for suture removal and wound check. She was seen April 25, had a nodule removed from her right lower leg. Pathology report was benign fibroadipose tissue. 3 sutures and Steri-Strips were placed. Reports the wound has healed well and denies erythema, pain, drainage or fevers. Notes that recent blood sugars have been lower in the mornings, as low as 73, causing her to feel dizzy and lightheaded. Her last A1c in January was 6.0. She continues to take Jardiance 25 mg, Ozempic 2 mg and glimepiride 4 mg twice daily. Reports her emotions have been better since beginning [address] followed by psychiatry. She has an appointment later today with her psychiatrist.  Amelia Gather to have crying spells and feelings of sadness. Denies suicidal or homicidal ideations, auditory or visual hallucinations. Allergies   Allergen Reactions    Amoxicillin Other (See Comments)     Itching     Oxycodone-Acetaminophen Itching     Past Medical History:   Diagnosis Date    MIRNA (acute kidney injury) ([facility] [address]     saw nephrologist 2/26/14. [place] due to volume depletion, meds.   Meds adjusted, now f/u

## 2023-05-12 LAB
EST. AVERAGE GLUCOSE BLD GHB EST-MCNC: 123 MG/DL
HBA1C MFR BLD: 5.9 % (ref 4.8–5.6)

## 2023-05-30 ENCOUNTER — TELEPHONE (OUTPATIENT)
Dept: INTERNAL MEDICINE CLINIC | Facility: CLINIC | Age: 48
End: 2023-05-30

## 2023-05-30 NOTE — TELEPHONE ENCOUNTER
I sent the message below via my chart, patient has not read the following message, please relay. Thanks! Ms. Rosalind Perry-  The a1c is down to a prediabetic range at 5.9 with an average daily blood sugar of 123. I am hopeful that cutting the Amaryl down to once daily will help avoid those lower blood sugars that you are experiencing. Please let us know if you need anything or have any new or concerning symptoms. Here is you need us!   Jarocho Caldwell, [address] [facility]

## 2023-05-30 NOTE — TELEPHONE ENCOUNTER
I have talked with Ms. Hawkins and she stated that her Sugars are fluctuating.   This morning sugars was 150 this am.

## 2023-06-07 DIAGNOSIS — E11.649 TYPE 2 DIABETES MELLITUS WITH HYPOGLYCEMIA WITHOUT COMA, WITHOUT LONG-TERM CURRENT USE OF INSULIN (HCC): ICD-10-CM

## 2023-06-07 RX ORDER — GLIMEPIRIDE 4 MG/1
TABLET ORAL
Qty: 60 TABLET | Refills: 5 | OUTPATIENT
Start: 2023-06-07

## 2023-06-29 RX ORDER — LOSARTAN POTASSIUM AND HYDROCHLOROTHIAZIDE 12.5; 1 MG/1; MG/1
TABLET ORAL
Qty: 30 TABLET | Refills: 5 | Status: SHIPPED | OUTPATIENT
Start: 2023-06-29

## 2023-07-05 ENCOUNTER — TELEPHONE (OUTPATIENT)
Dept: INTERNAL MEDICINE CLINIC | Facility: CLINIC | Age: 48
End: 2023-07-05

## 2023-07-05 NOTE — TELEPHONE ENCOUNTER
UTI SYMPTOMS    BURNING? no    FEVER? no  If yes, document temperature:     CHILLS? no    NAUSEA? no  VOMITING? no    BLOOD IN URINE? no    BACK PAIN? no    HOW LONG HAVE YOU HAD THE ABOVE SYMPTOMS? 2 days     ALLERGIES: yes  PHARMACY: [facility] in [place]   : 73

## (undated) DEVICE — CATHETER F BLLN 5CC 16FR 2 W HYDRGEL COAT LESS TRAUM LUB

## (undated) DEVICE — URETERAL ACCESS SHEATH SET: Brand: NAVIGATOR HD

## (undated) DEVICE — TRAY PREP DRY W/ PREM GLV 2 APPL 6 SPNG 2 UNDPD 1 OVERWRAP

## (undated) DEVICE — KENDALL SCD EXPRESS SLEEVES, KNEE LENGTH, MEDIUM: Brand: KENDALL SCD

## (undated) DEVICE — WATER 50W MAX / AIR 8W MAX: Brand: FLEXIVA TRACTIP

## (undated) DEVICE — CYSTO: Brand: MEDLINE INDUSTRIES, INC.

## (undated) DEVICE — SINGLE-USE DIGITAL FLEXIBLE URETEROSCOPE: Brand: LITHOVUE

## (undated) DEVICE — SOLUTION IRRIG 3000ML 0.9% SOD CHL FLX CONT 0797208] ICU MEDICAL INC]

## (undated) DEVICE — CYSTO/BLADDER IRRIGATION SET, REGULATING CLAMP

## (undated) DEVICE — GDWIRE 3CM FLX-TIP 0.038X150CM -- BX/5 SENSOR